# Patient Record
Sex: MALE | Race: WHITE | Employment: OTHER | URBAN - METROPOLITAN AREA
[De-identification: names, ages, dates, MRNs, and addresses within clinical notes are randomized per-mention and may not be internally consistent; named-entity substitution may affect disease eponyms.]

---

## 2017-04-22 ENCOUNTER — ED HISTORICAL/CONVERTED ENCOUNTER (OUTPATIENT)
Dept: OTHER | Age: 62
End: 2017-04-22

## 2018-10-09 ENCOUNTER — OP HISTORICAL/CONVERTED ENCOUNTER (OUTPATIENT)
Dept: OTHER | Age: 63
End: 2018-10-09

## 2018-10-10 ENCOUNTER — OP HISTORICAL/CONVERTED ENCOUNTER (OUTPATIENT)
Dept: OTHER | Age: 63
End: 2018-10-10

## 2018-10-24 ENCOUNTER — OP HISTORICAL/CONVERTED ENCOUNTER (OUTPATIENT)
Dept: OTHER | Age: 63
End: 2018-10-24

## 2018-11-26 ENCOUNTER — OP HISTORICAL/CONVERTED ENCOUNTER (OUTPATIENT)
Dept: OTHER | Age: 63
End: 2018-11-26

## 2019-02-21 ENCOUNTER — ED HISTORICAL/CONVERTED ENCOUNTER (OUTPATIENT)
Dept: OTHER | Age: 64
End: 2019-02-21

## 2019-03-15 ENCOUNTER — OP HISTORICAL/CONVERTED ENCOUNTER (OUTPATIENT)
Dept: OTHER | Age: 64
End: 2019-03-15

## 2020-06-02 ENCOUNTER — IP HISTORICAL/CONVERTED ENCOUNTER (OUTPATIENT)
Dept: OTHER | Age: 65
End: 2020-06-02

## 2020-06-20 ENCOUNTER — IP HISTORICAL/CONVERTED ENCOUNTER (OUTPATIENT)
Dept: OTHER | Age: 65
End: 2020-06-20

## 2020-07-06 ENCOUNTER — OP HISTORICAL/CONVERTED ENCOUNTER (OUTPATIENT)
Dept: OTHER | Age: 65
End: 2020-07-06

## 2020-08-01 ENCOUNTER — OP HISTORICAL/CONVERTED ENCOUNTER (OUTPATIENT)
Dept: OTHER | Age: 65
End: 2020-08-01

## 2020-08-30 ENCOUNTER — ED HISTORICAL/CONVERTED ENCOUNTER (OUTPATIENT)
Dept: OTHER | Age: 65
End: 2020-08-30

## 2020-10-23 ENCOUNTER — HOSPITAL ENCOUNTER (OUTPATIENT)
Dept: LAB | Age: 65
Discharge: HOME | End: 2020-10-23
Payer: MEDICARE

## 2020-10-23 ENCOUNTER — TRANSCRIBE ORDER (OUTPATIENT)
Dept: REGISTRATION | Age: 65
End: 2020-10-23

## 2020-10-23 DIAGNOSIS — E11.29 TYPE II OR UNSPECIFIED TYPE DIABETES MELLITUS WITH RENAL MANIFESTATIONS, UNCONTROLLED(250.42) (HCC): ICD-10-CM

## 2020-10-23 DIAGNOSIS — E11.65 TYPE II OR UNSPECIFIED TYPE DIABETES MELLITUS WITH RENAL MANIFESTATIONS, UNCONTROLLED(250.42) (HCC): ICD-10-CM

## 2020-10-23 DIAGNOSIS — E11.29 TYPE II OR UNSPECIFIED TYPE DIABETES MELLITUS WITH RENAL MANIFESTATIONS, UNCONTROLLED(250.42) (HCC): Primary | ICD-10-CM

## 2020-10-23 DIAGNOSIS — E11.65 TYPE II OR UNSPECIFIED TYPE DIABETES MELLITUS WITH RENAL MANIFESTATIONS, UNCONTROLLED(250.42) (HCC): Primary | ICD-10-CM

## 2020-10-23 PROCEDURE — 83036 HEMOGLOBIN GLYCOSYLATED A1C: CPT

## 2020-10-23 PROCEDURE — 82570 ASSAY OF URINE CREATININE: CPT

## 2020-10-23 PROCEDURE — 80061 LIPID PANEL: CPT

## 2020-10-23 PROCEDURE — 36415 COLL VENOUS BLD VENIPUNCTURE: CPT

## 2020-10-23 PROCEDURE — 80053 COMPREHEN METABOLIC PANEL: CPT

## 2020-10-23 PROCEDURE — 85025 COMPLETE CBC W/AUTO DIFF WBC: CPT

## 2020-10-23 PROCEDURE — 81001 URINALYSIS AUTO W/SCOPE: CPT

## 2020-10-23 PROCEDURE — 84443 ASSAY THYROID STIM HORMONE: CPT

## 2020-10-23 PROCEDURE — 84153 ASSAY OF PSA TOTAL: CPT

## 2020-10-23 PROCEDURE — 82043 UR ALBUMIN QUANTITATIVE: CPT

## 2021-06-16 ENCOUNTER — TRANSCRIBE ORDER (OUTPATIENT)
Dept: REGISTRATION | Age: 66
End: 2021-06-16

## 2021-06-16 ENCOUNTER — HOSPITAL ENCOUNTER (OUTPATIENT)
Dept: LAB | Age: 66
Discharge: HOME | End: 2021-06-16
Payer: MEDICARE

## 2021-06-16 DIAGNOSIS — B20 LAV-HTLV-III (LYMPHADENOPATHY-ASSOCIATED VIRUS-HUMAN T-LYMPHOTROPIC VIRUS TYPE III) (HCC): ICD-10-CM

## 2021-06-16 DIAGNOSIS — E78.2 MIXED HYPERLIPIDEMIA: ICD-10-CM

## 2021-06-16 DIAGNOSIS — Z87.891 PERSONAL HISTORY OF NICOTINE DEPENDENCE: ICD-10-CM

## 2021-06-16 DIAGNOSIS — I10 ESSENTIAL HYPERTENSION, MALIGNANT: ICD-10-CM

## 2021-06-16 DIAGNOSIS — I10 ESSENTIAL HYPERTENSION, MALIGNANT: Primary | ICD-10-CM

## 2021-06-16 DIAGNOSIS — E11.9 DIABETES MELLITUS (HCC): Primary | ICD-10-CM

## 2021-06-16 LAB
ALBUMIN SERPL-MCNC: 4.3 G/DL (ref 3.5–5)
ALBUMIN/GLOB SERPL: 1.5 {RATIO} (ref 1.1–2.2)
ALP SERPL-CCNC: 39 U/L (ref 45–117)
ALT SERPL-CCNC: 39 U/L (ref 12–78)
ANION GAP SERPL CALC-SCNC: 6 MMOL/L (ref 5–15)
APPEARANCE UR: CLEAR
AST SERPL W P-5'-P-CCNC: 65 U/L (ref 15–37)
BACTERIA URNS QL MICRO: NEGATIVE /HPF
BASOPHILS # BLD: 0.1 K/UL (ref 0–0.1)
BASOPHILS NFR BLD: 1 % (ref 0–1)
BILIRUB SERPL-MCNC: 1.3 MG/DL (ref 0.2–1)
BILIRUB UR QL: NEGATIVE
BUN SERPL-MCNC: 38 MG/DL (ref 6–20)
BUN/CREAT SERPL: 21 (ref 12–20)
CA-I BLD-MCNC: 8.8 MG/DL (ref 8.5–10.1)
CHLORIDE SERPL-SCNC: 108 MMOL/L (ref 97–108)
CO2 SERPL-SCNC: 29 MMOL/L (ref 21–32)
COLOR UR: ABNORMAL
CREAT SERPL-MCNC: 1.78 MG/DL (ref 0.7–1.3)
DIFFERENTIAL METHOD BLD: ABNORMAL
EOSINOPHIL # BLD: 0.6 K/UL (ref 0–0.4)
EOSINOPHIL NFR BLD: 7 % (ref 0–7)
ERYTHROCYTE [DISTWIDTH] IN BLOOD BY AUTOMATED COUNT: 13.1 % (ref 11.5–14.5)
GLOBULIN SER CALC-MCNC: 2.8 G/DL (ref 2–4)
GLUCOSE SERPL-MCNC: 115 MG/DL (ref 65–100)
GLUCOSE UR STRIP.AUTO-MCNC: NEGATIVE MG/DL
HCT VFR BLD AUTO: 43.4 % (ref 36.6–50.3)
HGB BLD-MCNC: 14 G/DL (ref 12.1–17)
HGB UR QL STRIP: NEGATIVE
HYALINE CASTS URNS QL MICRO: ABNORMAL /LPF (ref 0–5)
IMM GRANULOCYTES # BLD AUTO: 0 K/UL (ref 0–0.04)
IMM GRANULOCYTES NFR BLD AUTO: 0 % (ref 0–0.5)
KETONES UR QL STRIP.AUTO: NEGATIVE MG/DL
LEUKOCYTE ESTERASE UR QL STRIP.AUTO: NEGATIVE
LYMPHOCYTES # BLD: 1.8 K/UL (ref 0.8–3.5)
LYMPHOCYTES NFR BLD: 21 % (ref 12–49)
MCH RBC QN AUTO: 28.8 PG (ref 26–34)
MCHC RBC AUTO-ENTMCNC: 32.3 G/DL (ref 30–36.5)
MCV RBC AUTO: 89.3 FL (ref 80–99)
MONOCYTES # BLD: 0.5 K/UL (ref 0–1)
MONOCYTES NFR BLD: 6 % (ref 5–13)
MUCOUS THREADS URNS QL MICRO: ABNORMAL /LPF
NEUTS SEG # BLD: 5.7 K/UL (ref 1.8–8)
NEUTS SEG NFR BLD: 65 % (ref 32–75)
NITRITE UR QL STRIP.AUTO: NEGATIVE
NRBC # BLD: 0 K/UL (ref 0–0.01)
NRBC BLD-RTO: 0 PER 100 WBC
PH UR STRIP: 5 [PH] (ref 5–8)
PLATELET # BLD AUTO: 194 K/UL (ref 150–400)
PMV BLD AUTO: 9.9 FL (ref 8.9–12.9)
POTASSIUM SERPL-SCNC: 4.3 MMOL/L (ref 3.5–5.1)
PROT SERPL-MCNC: 7.1 G/DL (ref 6.4–8.2)
PROT UR STRIP-MCNC: NEGATIVE MG/DL
RBC # BLD AUTO: 4.86 M/UL (ref 4.1–5.7)
RBC #/AREA URNS HPF: ABNORMAL /HPF (ref 0–5)
SODIUM SERPL-SCNC: 143 MMOL/L (ref 136–145)
SP GR UR REFRACTOMETRY: 1.02 (ref 1–1.03)
UROBILINOGEN UR QL STRIP.AUTO: 0.1 EU/DL (ref 0.1–1)
WBC # BLD AUTO: 8.7 K/UL (ref 4.1–11.1)
WBC URNS QL MICRO: ABNORMAL /HPF (ref 0–4)

## 2021-06-16 PROCEDURE — 84153 ASSAY OF PSA TOTAL: CPT

## 2021-06-16 PROCEDURE — 82043 UR ALBUMIN QUANTITATIVE: CPT

## 2021-06-16 PROCEDURE — 80061 LIPID PANEL: CPT

## 2021-06-16 PROCEDURE — 84403 ASSAY OF TOTAL TESTOSTERONE: CPT

## 2021-06-16 PROCEDURE — 36415 COLL VENOUS BLD VENIPUNCTURE: CPT

## 2021-06-16 PROCEDURE — 80053 COMPREHEN METABOLIC PANEL: CPT

## 2021-06-16 PROCEDURE — 85025 COMPLETE CBC W/AUTO DIFF WBC: CPT

## 2021-06-16 PROCEDURE — 81001 URINALYSIS AUTO W/SCOPE: CPT

## 2021-06-16 PROCEDURE — 83036 HEMOGLOBIN GLYCOSYLATED A1C: CPT

## 2021-06-17 LAB
CHOLEST SERPL-MCNC: 99 MG/DL
CREAT UR-MCNC: 257 MG/DL
EST. AVERAGE GLUCOSE BLD GHB EST-MCNC: 111 MG/DL
HBA1C MFR BLD: 5.5 % (ref 4–5.6)
HDLC SERPL-MCNC: 38 MG/DL
HDLC SERPL: 2.6 {RATIO} (ref 0–5)
LDLC SERPL CALC-MCNC: 32.6 MG/DL (ref 0–100)
LIPID PROFILE,FLP: NORMAL
MICROALBUMIN UR-MCNC: 2.17 MG/DL
MICROALBUMIN/CREAT UR-RTO: 8 MGMALB/GCRE (ref 0–30)
PSA SERPL-MCNC: 4.5 NG/ML (ref 0.01–4)
TRIGL SERPL-MCNC: 142 MG/DL (ref ?–150)
VLDLC SERPL CALC-MCNC: 28.4 MG/DL

## 2021-10-15 ENCOUNTER — TRANSCRIBE ORDER (OUTPATIENT)
Dept: REGISTRATION | Age: 66
End: 2021-10-15

## 2021-10-15 ENCOUNTER — HOSPITAL ENCOUNTER (OUTPATIENT)
Dept: LAB | Age: 66
Discharge: HOME | End: 2021-10-15
Payer: MEDICARE

## 2021-10-15 DIAGNOSIS — L28.1 PRURIGO NODULARIS: ICD-10-CM

## 2021-10-15 DIAGNOSIS — E11.9 DIABETES MELLITUS (HCC): ICD-10-CM

## 2021-10-15 DIAGNOSIS — I10 ESSENTIAL HYPERTENSION, MALIGNANT: ICD-10-CM

## 2021-10-15 DIAGNOSIS — Z12.5 SPECIAL SCREENING, PROSTATE CANCER: ICD-10-CM

## 2021-10-15 DIAGNOSIS — E11.9 DIABETES MELLITUS (HCC): Primary | ICD-10-CM

## 2021-10-15 LAB
ALBUMIN SERPL-MCNC: 4.2 G/DL (ref 3.5–5)
ALBUMIN/GLOB SERPL: 1.5 {RATIO} (ref 1.1–2.2)
ALP SERPL-CCNC: 46 U/L (ref 45–117)
ALT SERPL-CCNC: 34 U/L (ref 12–78)
ANION GAP SERPL CALC-SCNC: 5 MMOL/L (ref 5–15)
APPEARANCE UR: ABNORMAL
AST SERPL W P-5'-P-CCNC: 36 U/L (ref 15–37)
BACTERIA URNS QL MICRO: NEGATIVE /HPF
BASOPHILS # BLD: 0.1 K/UL (ref 0–0.1)
BASOPHILS NFR BLD: 1 % (ref 0–1)
BILIRUB SERPL-MCNC: 1.2 MG/DL (ref 0.2–1)
BILIRUB UR QL: NEGATIVE
BUN SERPL-MCNC: 24 MG/DL (ref 6–20)
BUN/CREAT SERPL: 19 (ref 12–20)
CA-I BLD-MCNC: 9.2 MG/DL (ref 8.5–10.1)
CHLORIDE SERPL-SCNC: 111 MMOL/L (ref 97–108)
CO2 SERPL-SCNC: 28 MMOL/L (ref 21–32)
COLOR UR: ABNORMAL
CREAT SERPL-MCNC: 1.27 MG/DL (ref 0.7–1.3)
DIFFERENTIAL METHOD BLD: NORMAL
EOSINOPHIL # BLD: 0.4 K/UL (ref 0–0.4)
EOSINOPHIL NFR BLD: 5 % (ref 0–7)
ERYTHROCYTE [DISTWIDTH] IN BLOOD BY AUTOMATED COUNT: 13.5 % (ref 11.5–14.5)
GLOBULIN SER CALC-MCNC: 2.8 G/DL (ref 2–4)
GLUCOSE SERPL-MCNC: 107 MG/DL (ref 65–100)
GLUCOSE UR STRIP.AUTO-MCNC: NEGATIVE MG/DL
HCT VFR BLD AUTO: 41.9 % (ref 36.6–50.3)
HGB BLD-MCNC: 14 G/DL (ref 12.1–17)
HGB UR QL STRIP: NEGATIVE
IMM GRANULOCYTES # BLD AUTO: 0 K/UL (ref 0–0.04)
IMM GRANULOCYTES NFR BLD AUTO: 0 % (ref 0–0.5)
KETONES UR QL STRIP.AUTO: NEGATIVE MG/DL
LEUKOCYTE ESTERASE UR QL STRIP.AUTO: NEGATIVE
LYMPHOCYTES # BLD: 1.8 K/UL (ref 0.8–3.5)
LYMPHOCYTES NFR BLD: 21 % (ref 12–49)
MCH RBC QN AUTO: 29.7 PG (ref 26–34)
MCHC RBC AUTO-ENTMCNC: 33.4 G/DL (ref 30–36.5)
MCV RBC AUTO: 88.8 FL (ref 80–99)
MONOCYTES # BLD: 0.5 K/UL (ref 0–1)
MONOCYTES NFR BLD: 6 % (ref 5–13)
MUCOUS THREADS URNS QL MICRO: ABNORMAL /LPF
NEUTS SEG # BLD: 6 K/UL (ref 1.8–8)
NEUTS SEG NFR BLD: 67 % (ref 32–75)
NITRITE UR QL STRIP.AUTO: NEGATIVE
NRBC # BLD: 0 K/UL (ref 0–0.01)
NRBC BLD-RTO: 0 PER 100 WBC
PH UR STRIP: 5 [PH] (ref 5–8)
PLATELET # BLD AUTO: 189 K/UL (ref 150–400)
PMV BLD AUTO: 9.8 FL (ref 8.9–12.9)
POTASSIUM SERPL-SCNC: 4.1 MMOL/L (ref 3.5–5.1)
PROT SERPL-MCNC: 7 G/DL (ref 6.4–8.2)
PROT UR STRIP-MCNC: NEGATIVE MG/DL
RBC # BLD AUTO: 4.72 M/UL (ref 4.1–5.7)
RBC #/AREA URNS HPF: ABNORMAL /HPF (ref 0–5)
SODIUM SERPL-SCNC: 144 MMOL/L (ref 136–145)
SP GR UR REFRACTOMETRY: 1.02 (ref 1–1.03)
TSH SERPL DL<=0.05 MIU/L-ACNC: 0.97 UIU/ML (ref 0.36–3.74)
UROBILINOGEN UR QL STRIP.AUTO: 2 EU/DL (ref 0.1–1)
WBC # BLD AUTO: 8.8 K/UL (ref 4.1–11.1)
WBC URNS QL MICRO: ABNORMAL /HPF (ref 0–4)

## 2021-10-15 PROCEDURE — 82043 UR ALBUMIN QUANTITATIVE: CPT

## 2021-10-15 PROCEDURE — 84403 ASSAY OF TOTAL TESTOSTERONE: CPT

## 2021-10-15 PROCEDURE — 84153 ASSAY OF PSA TOTAL: CPT

## 2021-10-15 PROCEDURE — 80061 LIPID PANEL: CPT

## 2021-10-15 PROCEDURE — 86592 SYPHILIS TEST NON-TREP QUAL: CPT

## 2021-10-15 PROCEDURE — 36415 COLL VENOUS BLD VENIPUNCTURE: CPT

## 2021-10-15 PROCEDURE — 81001 URINALYSIS AUTO W/SCOPE: CPT

## 2021-10-15 PROCEDURE — 85025 COMPLETE CBC W/AUTO DIFF WBC: CPT

## 2021-10-15 PROCEDURE — 84443 ASSAY THYROID STIM HORMONE: CPT

## 2021-10-15 PROCEDURE — 83036 HEMOGLOBIN GLYCOSYLATED A1C: CPT

## 2021-10-15 PROCEDURE — 80053 COMPREHEN METABOLIC PANEL: CPT

## 2021-10-16 LAB
CHOLEST SERPL-MCNC: 116 MG/DL
CREAT UR-MCNC: 269 MG/DL
HDLC SERPL-MCNC: 48 MG/DL
HDLC SERPL: 2.4 {RATIO} (ref 0–5)
LDLC SERPL CALC-MCNC: 49.6 MG/DL (ref 0–100)
LIPID PROFILE,FLP: NORMAL
MICROALBUMIN UR-MCNC: 7.13 MG/DL
MICROALBUMIN/CREAT UR-RTO: 27 MGMALB/GCRE (ref 0–30)
PSA SERPL-MCNC: 5.8 NG/ML (ref 0.01–4)
RPR SER QL: NONREACTIVE
TRIGL SERPL-MCNC: 92 MG/DL (ref ?–150)
VLDLC SERPL CALC-MCNC: 18.4 MG/DL

## 2021-10-17 LAB
COMMENT, TESC2: NORMAL
TESTOST SERPL-MCNC: 364 NG/DL (ref 264–916)

## 2021-11-22 ENCOUNTER — HOSPITAL ENCOUNTER (OUTPATIENT)
Dept: PREADMISSION TESTING | Age: 66
Discharge: HOME | End: 2021-11-22
Payer: MEDICARE

## 2021-11-22 VITALS
TEMPERATURE: 98.2 F | HEART RATE: 49 BPM | DIASTOLIC BLOOD PRESSURE: 77 MMHG | HEIGHT: 70 IN | SYSTOLIC BLOOD PRESSURE: 155 MMHG | OXYGEN SATURATION: 98 % | WEIGHT: 189 LBS | RESPIRATION RATE: 18 BRPM | BODY MASS INDEX: 27.06 KG/M2

## 2021-11-22 LAB
ALBUMIN SERPL-MCNC: 3.6 G/DL (ref 3.5–5)
ALBUMIN/GLOB SERPL: 1.1 {RATIO} (ref 1.1–2.2)
ALP SERPL-CCNC: 52 U/L (ref 45–117)
ALT SERPL-CCNC: 28 U/L (ref 12–78)
ANION GAP SERPL CALC-SCNC: 2 MMOL/L (ref 5–15)
APPEARANCE UR: CLEAR
APTT PPP: 34.6 SEC (ref 21.2–34.1)
AST SERPL W P-5'-P-CCNC: 21 U/L (ref 15–37)
BACTERIA URNS QL MICRO: NEGATIVE /HPF
BILIRUB SERPL-MCNC: 0.6 MG/DL (ref 0.2–1)
BILIRUB UR QL: NEGATIVE
BUN SERPL-MCNC: 14 MG/DL (ref 6–20)
BUN/CREAT SERPL: 11 (ref 12–20)
CA-I BLD-MCNC: 8.8 MG/DL (ref 8.5–10.1)
CHLORIDE SERPL-SCNC: 112 MMOL/L (ref 97–108)
CO2 SERPL-SCNC: 28 MMOL/L (ref 21–32)
COLOR UR: NORMAL
CREAT SERPL-MCNC: 1.25 MG/DL (ref 0.7–1.3)
ERYTHROCYTE [DISTWIDTH] IN BLOOD BY AUTOMATED COUNT: 13.8 % (ref 11.5–14.5)
GLOBULIN SER CALC-MCNC: 3.4 G/DL (ref 2–4)
GLUCOSE SERPL-MCNC: 84 MG/DL (ref 65–100)
GLUCOSE UR STRIP.AUTO-MCNC: NEGATIVE MG/DL
HCT VFR BLD AUTO: 43 % (ref 36.6–50.3)
HGB BLD-MCNC: 14.1 G/DL (ref 12.1–17)
HGB UR QL STRIP: NEGATIVE
INR PPP: 1.1 (ref 0.9–1.1)
KETONES UR QL STRIP.AUTO: NEGATIVE MG/DL
LEUKOCYTE ESTERASE UR QL STRIP.AUTO: NEGATIVE
MCH RBC QN AUTO: 29.6 PG (ref 26–34)
MCHC RBC AUTO-ENTMCNC: 32.8 G/DL (ref 30–36.5)
MCV RBC AUTO: 90.1 FL (ref 80–99)
NITRITE UR QL STRIP.AUTO: NEGATIVE
NRBC # BLD: 0 K/UL (ref 0–0.01)
NRBC BLD-RTO: 0 PER 100 WBC
PH UR STRIP: 7 [PH] (ref 5–8)
PLATELET # BLD AUTO: 208 K/UL (ref 150–400)
PMV BLD AUTO: 10.2 FL (ref 8.9–12.9)
POTASSIUM SERPL-SCNC: 4 MMOL/L (ref 3.5–5.1)
PROT SERPL-MCNC: 7 G/DL (ref 6.4–8.2)
PROT UR STRIP-MCNC: NEGATIVE MG/DL
PROTHROMBIN TIME: 14 SEC (ref 11.9–14.7)
RBC # BLD AUTO: 4.77 M/UL (ref 4.1–5.7)
RBC #/AREA URNS HPF: NORMAL /HPF (ref 0–5)
SODIUM SERPL-SCNC: 142 MMOL/L (ref 136–145)
SP GR UR REFRACTOMETRY: <1.005 (ref 1–1.03)
THERAPEUTIC RANGE,PTTT: ABNORMAL SEC (ref 82–109)
UA: UC IF INDICATED,UAUC: NORMAL
UROBILINOGEN UR QL STRIP.AUTO: 0.1 EU/DL (ref 0.1–1)
WBC # BLD AUTO: 11.3 K/UL (ref 4.1–11.1)
WBC URNS QL MICRO: NORMAL /HPF (ref 0–4)

## 2021-11-22 PROCEDURE — 80053 COMPREHEN METABOLIC PANEL: CPT

## 2021-11-22 PROCEDURE — 81001 URINALYSIS AUTO W/SCOPE: CPT

## 2021-11-22 PROCEDURE — 85610 PROTHROMBIN TIME: CPT

## 2021-11-22 PROCEDURE — 85730 THROMBOPLASTIN TIME PARTIAL: CPT

## 2021-11-22 PROCEDURE — 85027 COMPLETE CBC AUTOMATED: CPT

## 2021-11-22 PROCEDURE — 36415 COLL VENOUS BLD VENIPUNCTURE: CPT

## 2021-11-22 RX ORDER — ROSUVASTATIN CALCIUM 10 MG/1
10 TABLET, COATED ORAL
COMMUNITY
End: 2021-12-16 | Stop reason: ALTCHOICE

## 2021-11-22 RX ORDER — CLOPIDOGREL BISULFATE 75 MG/1
75 TABLET ORAL DAILY
COMMUNITY

## 2021-11-22 RX ORDER — LISINOPRIL 40 MG/1
40 TABLET ORAL DAILY
COMMUNITY
End: 2021-12-16 | Stop reason: ALTCHOICE

## 2021-11-22 RX ORDER — ASPIRIN 81 MG/1
81 TABLET ORAL DAILY
COMMUNITY

## 2021-11-22 RX ORDER — METOPROLOL SUCCINATE 50 MG/1
25 TABLET, EXTENDED RELEASE ORAL DAILY
COMMUNITY
End: 2021-12-16 | Stop reason: ALTCHOICE

## 2021-11-22 NOTE — PROGRESS NOTES
80- Dr. Mallory De La Fuente called made aware patient stated during PAT approx 2 weeks ago he noticed swelling, pain, and a knot in his left lower leg went to see his PCP and was told he had a blood clot and to apply heat to the area the patient stated he did this and the pain and swelling have subsided and the knot is almost gone. Also made Dr. Mallory De La Fuente aware that the patient stated the scabbed areas all over his body are from a reaction to medication that he no longer takes. No new orders received Dr. Mallory De La Fuente stated ok to proceed with procedure.

## 2021-11-22 NOTE — PROGRESS NOTES
5- Dr. Clements Doctor called made aware of patient's abnormal labs CMP-- Chloride 112, Anion gap 2, BUN/Creatinine ratio 11, GFR est non-AA 58, CBC-- WBC 11.3, PTT 34.6. No new orders received stated ok to proceed with cardiac cath as scheduled.

## 2021-11-23 ENCOUNTER — HOSPITAL ENCOUNTER (OUTPATIENT)
Age: 66
Discharge: HOME | End: 2021-11-24
Attending: INTERNAL MEDICINE | Admitting: INTERNAL MEDICINE
Payer: MEDICARE

## 2021-11-23 DIAGNOSIS — I20.0 UNSTABLE ANGINA (HCC): ICD-10-CM

## 2021-11-23 DIAGNOSIS — R94.39 ABNORMAL STRESS TEST: ICD-10-CM

## 2021-11-23 PROBLEM — R07.9 CHEST PAIN: Status: ACTIVE | Noted: 2021-11-23

## 2021-11-23 LAB
ACT BLD: 288 SEC (ref 74–125)
ATRIAL RATE: 84 BPM
CALCULATED P AXIS, ECG09: 70 DEGREES
CALCULATED R AXIS, ECG10: 84 DEGREES
CALCULATED T AXIS, ECG11: 109 DEGREES
DIAGNOSIS, 93000: NORMAL
P-R INTERVAL, ECG05: 206 MS
PERFORMED BY, TECHID: ABNORMAL
Q-T INTERVAL, ECG07: 392 MS
QRS DURATION, ECG06: 84 MS
QTC CALCULATION (BEZET), ECG08: 463 MS
VENTRICULAR RATE, ECG03: 84 BPM

## 2021-11-23 PROCEDURE — 85347 COAGULATION TIME ACTIVATED: CPT

## 2021-11-23 PROCEDURE — 77030013516 HC DEV INFL ANGI MRTM -B: Performed by: INTERNAL MEDICINE

## 2021-11-23 PROCEDURE — 74011000636 HC RX REV CODE- 636: Performed by: INTERNAL MEDICINE

## 2021-11-23 PROCEDURE — 74011250637 HC RX REV CODE- 250/637: Performed by: INTERNAL MEDICINE

## 2021-11-23 PROCEDURE — 74011000250 HC RX REV CODE- 250: Performed by: INTERNAL MEDICINE

## 2021-11-23 PROCEDURE — C1887 CATHETER, GUIDING: HCPCS | Performed by: INTERNAL MEDICINE

## 2021-11-23 PROCEDURE — 99153 MOD SED SAME PHYS/QHP EA: CPT | Performed by: INTERNAL MEDICINE

## 2021-11-23 PROCEDURE — 77030025703 HC SYR ANGI VACLOK MRTM -A: Performed by: INTERNAL MEDICINE

## 2021-11-23 PROCEDURE — 2709999900 HC NON-CHARGEABLE SUPPLY: Performed by: INTERNAL MEDICINE

## 2021-11-23 PROCEDURE — 77030008542 HC TBNG MON PRSS EDWD -A: Performed by: INTERNAL MEDICINE

## 2021-11-23 PROCEDURE — C1769 GUIDE WIRE: HCPCS | Performed by: INTERNAL MEDICINE

## 2021-11-23 PROCEDURE — 77030016699 HC CATH ANGI DX INFN1 CARD -A: Performed by: INTERNAL MEDICINE

## 2021-11-23 PROCEDURE — 77030015766: Performed by: INTERNAL MEDICINE

## 2021-11-23 PROCEDURE — 74011250636 HC RX REV CODE- 250/636: Performed by: INTERNAL MEDICINE

## 2021-11-23 PROCEDURE — 93458 L HRT ARTERY/VENTRICLE ANGIO: CPT | Performed by: INTERNAL MEDICINE

## 2021-11-23 PROCEDURE — C1725 CATH, TRANSLUMIN NON-LASER: HCPCS | Performed by: INTERNAL MEDICINE

## 2021-11-23 PROCEDURE — C1894 INTRO/SHEATH, NON-LASER: HCPCS | Performed by: INTERNAL MEDICINE

## 2021-11-23 PROCEDURE — 76210000063 HC OR PH I REC FIRST 0.5 HR: Performed by: INTERNAL MEDICINE

## 2021-11-23 PROCEDURE — 77030042317 HC BND COMPR HEMSTAT -B: Performed by: INTERNAL MEDICINE

## 2021-11-23 PROCEDURE — 93005 ELECTROCARDIOGRAM TRACING: CPT

## 2021-11-23 PROCEDURE — 77030012468 HC VLV BLEEDBK CNTRL ABBT -B: Performed by: INTERNAL MEDICINE

## 2021-11-23 PROCEDURE — 99152 MOD SED SAME PHYS/QHP 5/>YRS: CPT | Performed by: INTERNAL MEDICINE

## 2021-11-23 PROCEDURE — 77030019698 HC SYR ANGI MDLON MRTM -A: Performed by: INTERNAL MEDICINE

## 2021-11-23 PROCEDURE — C1874 STENT, COATED/COV W/DEL SYS: HCPCS | Performed by: INTERNAL MEDICINE

## 2021-11-23 PROCEDURE — 92928 PRQ TCAT PLMT NTRAC ST 1 LES: CPT | Performed by: INTERNAL MEDICINE

## 2021-11-23 DEVICE — XIENCE SIERRA™ EVEROLIMUS ELUTING CORONARY STENT SYSTEM 2.75 MM X 15 MM / RAPID-EXCHANGE
Type: IMPLANTABLE DEVICE | Site: CORONARY | Status: FUNCTIONAL
Brand: XIENCE SIERRA™

## 2021-11-23 RX ORDER — HYDRALAZINE HYDROCHLORIDE 20 MG/ML
INJECTION INTRAMUSCULAR; INTRAVENOUS AS NEEDED
Status: DISCONTINUED | OUTPATIENT
Start: 2021-11-23 | End: 2021-11-23 | Stop reason: HOSPADM

## 2021-11-23 RX ORDER — SODIUM CHLORIDE 9 MG/ML
50 INJECTION, SOLUTION INTRAVENOUS CONTINUOUS
Status: DISCONTINUED | OUTPATIENT
Start: 2021-11-23 | End: 2021-11-24 | Stop reason: HOSPADM

## 2021-11-23 RX ORDER — ONDANSETRON 2 MG/ML
4 INJECTION INTRAMUSCULAR; INTRAVENOUS
Status: DISCONTINUED | OUTPATIENT
Start: 2021-11-23 | End: 2021-11-24 | Stop reason: HOSPADM

## 2021-11-23 RX ORDER — FENTANYL CITRATE 50 UG/ML
INJECTION, SOLUTION INTRAMUSCULAR; INTRAVENOUS AS NEEDED
Status: DISCONTINUED | OUTPATIENT
Start: 2021-11-23 | End: 2021-11-23 | Stop reason: HOSPADM

## 2021-11-23 RX ORDER — SODIUM CHLORIDE 0.9 % (FLUSH) 0.9 %
5-40 SYRINGE (ML) INJECTION EVERY 8 HOURS
Status: DISCONTINUED | OUTPATIENT
Start: 2021-11-23 | End: 2021-11-24 | Stop reason: HOSPADM

## 2021-11-23 RX ORDER — HEPARIN SODIUM 200 [USP'U]/100ML
INJECTION, SOLUTION INTRAVENOUS
Status: COMPLETED | OUTPATIENT
Start: 2021-11-23 | End: 2021-11-23

## 2021-11-23 RX ORDER — LISINOPRIL 40 MG/1
40 TABLET ORAL DAILY
Status: DISCONTINUED | OUTPATIENT
Start: 2021-11-24 | End: 2021-11-24 | Stop reason: HOSPADM

## 2021-11-23 RX ORDER — VERAPAMIL HYDROCHLORIDE 2.5 MG/ML
INJECTION, SOLUTION INTRAVENOUS AS NEEDED
Status: DISCONTINUED | OUTPATIENT
Start: 2021-11-23 | End: 2021-11-23 | Stop reason: HOSPADM

## 2021-11-23 RX ORDER — HEPARIN SODIUM 1000 [USP'U]/ML
INJECTION, SOLUTION INTRAVENOUS; SUBCUTANEOUS AS NEEDED
Status: DISCONTINUED | OUTPATIENT
Start: 2021-11-23 | End: 2021-11-23 | Stop reason: HOSPADM

## 2021-11-23 RX ORDER — MIDAZOLAM HYDROCHLORIDE 1 MG/ML
INJECTION INTRAMUSCULAR; INTRAVENOUS AS NEEDED
Status: DISCONTINUED | OUTPATIENT
Start: 2021-11-23 | End: 2021-11-23 | Stop reason: HOSPADM

## 2021-11-23 RX ORDER — NITROGLYCERIN 5 MG/ML
INJECTION, SOLUTION INTRAVENOUS AS NEEDED
Status: DISCONTINUED | OUTPATIENT
Start: 2021-11-23 | End: 2021-11-23 | Stop reason: HOSPADM

## 2021-11-23 RX ORDER — LIDOCAINE HYDROCHLORIDE 10 MG/ML
INJECTION INFILTRATION; PERINEURAL AS NEEDED
Status: DISCONTINUED | OUTPATIENT
Start: 2021-11-23 | End: 2021-11-23 | Stop reason: HOSPADM

## 2021-11-23 RX ORDER — CLOPIDOGREL BISULFATE 75 MG/1
75 TABLET ORAL DAILY
Status: DISCONTINUED | OUTPATIENT
Start: 2021-11-24 | End: 2021-11-24 | Stop reason: HOSPADM

## 2021-11-23 RX ORDER — SODIUM CHLORIDE 0.9 % (FLUSH) 0.9 %
5-40 SYRINGE (ML) INJECTION AS NEEDED
Status: DISCONTINUED | OUTPATIENT
Start: 2021-11-23 | End: 2021-11-24 | Stop reason: HOSPADM

## 2021-11-23 RX ORDER — METOPROLOL SUCCINATE 25 MG/1
25 TABLET, EXTENDED RELEASE ORAL DAILY
Status: DISCONTINUED | OUTPATIENT
Start: 2021-11-24 | End: 2021-11-24 | Stop reason: HOSPADM

## 2021-11-23 RX ORDER — ROSUVASTATIN CALCIUM 5 MG/1
10 TABLET, COATED ORAL
Status: DISCONTINUED | OUTPATIENT
Start: 2021-11-23 | End: 2021-11-24 | Stop reason: HOSPADM

## 2021-11-23 RX ORDER — ASPIRIN 81 MG/1
81 TABLET ORAL DAILY
Status: DISCONTINUED | OUTPATIENT
Start: 2021-11-24 | End: 2021-11-24 | Stop reason: HOSPADM

## 2021-11-23 RX ADMIN — ONDANSETRON 4 MG: 2 INJECTION INTRAMUSCULAR; INTRAVENOUS at 12:43

## 2021-11-23 RX ADMIN — NITROGLYCERIN 1 INCH: 20 OINTMENT TOPICAL at 20:26

## 2021-11-23 RX ADMIN — SODIUM CHLORIDE 50 ML/HR: 9 INJECTION, SOLUTION INTRAVENOUS at 08:36

## 2021-11-23 RX ADMIN — NITROGLYCERIN 1 INCH: 20 OINTMENT TOPICAL at 14:32

## 2021-11-23 RX ADMIN — ROSUVASTATIN CALCIUM 10 MG: 5 TABLET, COATED ORAL at 20:26

## 2021-11-23 RX ADMIN — Medication 10 ML: at 21:29

## 2021-11-23 NOTE — Clinical Note
TRANSFER - OUT REPORT:     Verbal report given to: Aniyah (at bedside). Report consisted of patient's Situation, Background, Assessment and   Recommendations(SBAR). Opportunity for questions and clarification was provided. Patient transported with a Registered Nurse and Monitor. Patient transported to: recovery.

## 2021-11-23 NOTE — Clinical Note
Diagnostic wire inserted. Spine appears normal, range of motion is not limited, no muscle or joint tenderness

## 2021-11-23 NOTE — PROGRESS NOTES
Patient transferred from PACU in stable condition. Initial VS and assessment performed. Reviewed plan of care with patient.

## 2021-11-23 NOTE — PROGRESS NOTES
Problem: Falls - Risk of  Goal: *Absence of Falls  Description: Document [place] Flow Fall Risk and appropriate interventions in the flowsheet.   Outcome: Progressing Towards Goal  Note: Fall Risk Interventions:                                Problem: Patient Education: Go to Patient Education Activity  Goal: Patient/Family Education  Outcome: Progressing Towards Goal

## 2021-11-23 NOTE — Clinical Note
Sheath #1: Sheath: inserted. Sheath inserted/placed in the right radial  artery.  6Fr Glidesheath slender

## 2021-11-23 NOTE — Clinical Note
Contrast Dose Calculator:   Patient's age: 78.   Patient's sex: Male. Patient weight (kg) = 85.7. Creatinine level (mg/dL) = 1.25. Creatinine clearance (mL/min): 69.51. Contrast concentration (mg/mL) = 370. MACD = 277.95 mL. Max Contrast dose per Creatinine Cl calculator = 156.4 mL.

## 2021-11-23 NOTE — Clinical Note
Right groin and right radial prepped with ChloraPrep and draped. Wet prep solution applied at: 951. Wet prep solution dried at: 954. Wet prep elapsed drying time: 3 mins.

## 2021-11-23 NOTE — ROUTINE PROCESS
Family updated at 1:09 PM by Krissy Mckeon RN.   Wife Reggie Jaramillo [phone]  ntfd pt waiting on bed assignment

## 2021-11-24 VITALS
SYSTOLIC BLOOD PRESSURE: 136 MMHG | HEART RATE: 68 BPM | WEIGHT: 182.2 LBS | TEMPERATURE: 98.3 F | DIASTOLIC BLOOD PRESSURE: 66 MMHG | OXYGEN SATURATION: 95 % | BODY MASS INDEX: 26.14 KG/M2 | RESPIRATION RATE: 18 BRPM

## 2021-11-24 LAB
ANION GAP SERPL CALC-SCNC: 5 MMOL/L (ref 5–15)
BUN SERPL-MCNC: 20 MG/DL (ref 6–20)
BUN/CREAT SERPL: 15 (ref 12–20)
CA-I BLD-MCNC: 8.5 MG/DL (ref 8.5–10.1)
CHLORIDE SERPL-SCNC: 114 MMOL/L (ref 97–108)
CO2 SERPL-SCNC: 26 MMOL/L (ref 21–32)
CREAT SERPL-MCNC: 1.3 MG/DL (ref 0.7–1.3)
ERYTHROCYTE [DISTWIDTH] IN BLOOD BY AUTOMATED COUNT: 14 % (ref 11.5–14.5)
GLUCOSE SERPL-MCNC: 133 MG/DL (ref 65–100)
HCT VFR BLD AUTO: 41.7 % (ref 36.6–50.3)
HGB BLD-MCNC: 14.1 G/DL (ref 12.1–17)
MCH RBC QN AUTO: 30.1 PG (ref 26–34)
MCHC RBC AUTO-ENTMCNC: 33.8 G/DL (ref 30–36.5)
MCV RBC AUTO: 89.1 FL (ref 80–99)
NRBC # BLD: 0 K/UL (ref 0–0.01)
NRBC BLD-RTO: 0 PER 100 WBC
PLATELET # BLD AUTO: 215 K/UL (ref 150–400)
PMV BLD AUTO: 9.8 FL (ref 8.9–12.9)
POTASSIUM SERPL-SCNC: 3.7 MMOL/L (ref 3.5–5.1)
RBC # BLD AUTO: 4.68 M/UL (ref 4.1–5.7)
SODIUM SERPL-SCNC: 145 MMOL/L (ref 136–145)
WBC # BLD AUTO: 15.4 K/UL (ref 4.1–11.1)

## 2021-11-24 PROCEDURE — 36415 COLL VENOUS BLD VENIPUNCTURE: CPT

## 2021-11-24 PROCEDURE — 85027 COMPLETE CBC AUTOMATED: CPT

## 2021-11-24 PROCEDURE — 80048 BASIC METABOLIC PNL TOTAL CA: CPT

## 2021-11-24 PROCEDURE — 74011250637 HC RX REV CODE- 250/637: Performed by: INTERNAL MEDICINE

## 2021-11-24 RX ADMIN — LISINOPRIL 40 MG: 40 TABLET ORAL at 08:43

## 2021-11-24 RX ADMIN — METOPROLOL SUCCINATE 25 MG: 25 TABLET, EXTENDED RELEASE ORAL at 08:43

## 2021-11-24 RX ADMIN — CLOPIDOGREL BISULFATE 75 MG: 75 TABLET ORAL at 08:43

## 2021-11-24 RX ADMIN — ASPIRIN 81 MG: 81 TABLET, COATED ORAL at 08:43

## 2021-11-24 RX ADMIN — NITROGLYCERIN 1 INCH: 20 OINTMENT TOPICAL at 08:43

## 2021-11-24 NOTE — DISCHARGE SUMMARY
Cardiology Discharge Summary     Patient ID:  Nitesh Adan  [id number]  89 y.o.  1955    Allergies: Penicillins    Admit Date: 11/23/2021    Discharge Date: 11/24/2021     Admitting Physician: Alex Viera MD     Discharge Physician: Alex Viera MD    * Admission Diagnoses: Abnormal stress test [R94.39]  Unstable angina [facility]) [I20.0]  Chest pain [R07.9]    * Discharge Diagnoses:   Hospital Problems as of 11/24/2021 Never Reviewed          Codes Class Noted - Resolved POA    Chest pain ICD-10-CM: R07.9  ICD-9-CM: 786.50  11/23/2021 - Present Unknown              * Discharge Condition: good    * Cardiology Procedures this Admission:  Left heart catheterization with PCI      [facility] Hospital Course: Patient is a 54-year-old male with a history of coronary artery disease status post PCI, hypertension who presented for elective left heart catheterization due to recent outpatient abnormal stress test.  Cardiac catheterization was performed via right radial artery, final report pending. He underwent successful percutaneous coronary intervention to the mid LAD distal to previously placed stents with a 2.75 mm X 15 millimeter Xience drug-eluting stent with excellent postprocedure angiographic results. He was kept overnight following the procedure. When seen by me on 11/24/2021, patient denied any complaints. Right wrist had no evidence for swelling. Renal function remained stable. He was discharged to home in stable condition and will continue outpatient follow-up. Discharge Exam:     Visit Vitals  /66 (BP Patient Position: At rest;Lying right side)   Pulse 68   Temp 98.3 °F (36.8 °C)   Resp 18   Wt 82.6 kg (182 lb 3.2 oz)   SpO2 95%   BMI 26.14 kg/m²     General Appearance:  Well developed, well nourished,alert and oriented x 3, and individual in no acute distress. Ears/Nose/Mouth/Throat:   Hearing grossly normal.         Neck: Supple. Chest:   Lungs clear to auscultation bilaterally.    Cardiovascular: Regular rate and rhythm, S1, S2 normal, no murmur. Abdomen:   Soft, non-tender, bowel sounds are active. Extremities: No edema bilaterally. Skin: Warm and dry. * Disposition: Home    Discharge Medications:   Discharge Medication List as of 11/24/2021 11:35 AM      CONTINUE these medications which have NOT CHANGED    Details   aspirin delayed-release 81 mg tablet Take 81 mg by mouth daily. , Historical Med      lisinopriL (PRINIVIL, ZESTRIL) 40 mg tablet Take 40 mg by mouth daily. , Historical Med      clopidogreL (Plavix) 75 mg tab Take 75 mg by mouth daily. , Historical Med      metoprolol succinate (TOPROL-XL) 50 mg XL tablet Take 25 mg by mouth daily. , Historical Med      rosuvastatin (CRESTOR) 10 mg tablet Take 10 mg by mouth nightly., Historical Med             * Follow-up Care/Patient Instructions: Activity:Activity as tolerated  Diet: Cardiac Diet  Wound Care: As directed    Follow-up Information     Follow up With Specialties Details Why Contact Stephanie Rocha MD Family Medicine  Per [address], Patient or Family Member must call and kyrie follow-up appointment.  [address] [address]  [phone]      Brent Figueroa MD Cardiology Go on 12/2/2021 2;45pm [place] [address]  [phone]            Signed:  Lance Rios MD  11/24/2021  4:27 PM

## 2021-11-24 NOTE — DISCHARGE INSTRUCTIONS
Patient Education        Coronary Angiogram: What to Expect at Home  Your Recovery     A coronary angiogram is a test to examine the large blood vessel of your heart (coronary artery). The doctor inserted a thin, flexible tube (catheter) into a blood vessel in your groin. In some cases, the catheter is placed in a blood vessel in the arm. Your groin or arm may have a bruise and feel sore for a day or two after the procedure. You can do light activities around the house but nothing strenuous for several days. This care sheet gives you a general idea about how long it will take for you to recover. But each person recovers at a different pace. Follow the steps below to feel better as quickly as possible. How can you care for yourself at home? Activity    · If the doctor gave you a sedative:  ? For 24 hours, don't do anything that requires attention to detail, such as going to work, making important decisions, or signing any legal documents. It takes time for the medicine's effects to completely wear off.  ? For your safety, do not drive or operate any machinery that could be dangerous. Wait until the medicine wears off and you can think clearly and react easily.     · Do not do strenuous exercise and do not lift, pull, or push anything heavy until your doctor says it is okay. This may be for a day or two. You can walk around the house and do light activity, such as cooking.     · If the catheter was placed in your groin, try not to walk up stairs for the first couple of days.     · If the catheter was placed in your arm near your wrist, do not bend your wrist deeply for the first couple of days. Be careful using your hand to get into and out of a chair or bed.     · If your doctor recommends it, get more exercise. Walking is a good choice. Bit by bit, increase the amount you walk every day. Try for at least 30 minutes on most days of the week. Diet    · Drink plenty of fluids to help your body flush out the dye. If you have kidney, heart, or liver disease and have to limit fluids, talk with your doctor before you increase the amount of fluids you drink.     · Keep eating a heart-healthy diet that has lots of fruits, vegetables, and whole grains. If you have not been eating this way, talk to your doctor. You also may want to talk to a dietitian. This expert can help you to learn about healthy foods and plan meals. Medicines    · Your doctor will tell you if and when you can restart your medicines. He or she will also give you instructions about taking any new medicines.     · If you take aspirin or some other blood thinner, ask your doctor if and when to start taking it again. Make sure that you understand exactly what your doctor wants you to do.     · Your doctor may prescribe a blood-thinning medicine like aspirin or clopidogrel (Plavix). It is very important that you take these medicines exactly as directed in order to keep the coronary artery open and reduce your risk of a heart attack. Be safe with medicines. Call your doctor if you think you are having a problem with your medicine. Care of the catheter site    · For 1 or 2 days, keep a bandage over the spot where the catheter was inserted. The bandage probably will fall off in this time.     · Put ice or a cold pack on the area for 10 to 20 minutes at a time to help with soreness or swelling. Put a thin cloth between the ice and your skin.     · You may shower 24 to 48 hours after the procedure, if your doctor okays it. Pat the incision dry.     · Do not soak the catheter site until it is healed. Don't take a bath for 1 week, or until your doctor tells you it is okay.     · Watch for bleeding from the site. A small amount of blood (up to the size of a quarter) on the bandage can be normal.     · If you are bleeding, lie down and press on the area for 15 minutes to try to make it stop. If the bleeding does not stop, call your doctor or seek immediate medical care. Follow-up care is a key part of your treatment and safety. Be sure to make and go to all appointments, and call your doctor if you are having problems. It's also a good idea to know your test results and keep a list of the medicines you take. When should you call for help? Call 911 anytime you think you may need emergency care. For example, call if:    · You passed out (lost consciousness).     · You have severe trouble breathing.     · You have sudden chest pain and shortness of breath, or you cough up blood.     · You have symptoms of a heart attack. These may include:  ? Chest pain or pressure, or a strange feeling in the chest.  ? Sweating. ? Shortness of breath. ? Nausea or vomiting. ? Pain, pressure, or a strange feeling in the back, neck, jaw, or upper belly, or in one or both shoulders or arms. ? Lightheadedness or sudden weakness. ? A fast or irregular heartbeat. After you call 911, the  may tel you to chew 1 adult-strength or 2 to 4 low-dose aspirin. Wait for an ambulance. Do not try to drive yourself.     · You have been diagnosed with angina, and you have symptoms that do not go away with rest or are not getting better within 5 minutes after you take a dose of nitroglycerin. Call your doctor now or seek immediate medical care if:    · You are bleeding from the area where the catheter was put in your artery.     · You have a fast-growing, painful lump at the catheter site.     · You have signs of infection, such as:  ? Increased pain, swelling, warmth, or redness. ? Red streaks leading from the catheter site. ? Pus draining from the catheter site. ? A fever.     · Your leg, arm, or hand is painful, looks blue, or feels cold, numb, or tingly. Watch closely for changes in your health, and be sure to contact your doctor if you have any problems. Where can you learn more?   Go to http://www.gray.com/  Enter D192 in the search box to learn more about \"Coronary Angiogram: What to Expect at Home. \"  Current as of: April 29, 2021               Content Version: 13.0  © 9043-0364 [employer], [employer]. Care instructions adapted under license by [facility] (which disclaims liability or warranty for this information). If you have questions about a medical condition or this instruction, always ask your healthcare professional. [address] any warranty or liability for your use of this information.

## 2021-11-24 NOTE — PROGRESS NOTES
Patient given discharge instructions. Follow appointments discussed and medications reviewed. Family at bedside. IV removed. Telemetry removed and returned. Patent is ready for discharge at this time. Estimated pickup 1130. Cath card given and instructions. Post cath instructions reviewed. Patient stated Dr. Nighat Salas wanted to see him in two week, added to AVS.  Discharge plan of care/case management plan validated with provider discharge order. 1- Dr. Nighat Salas was called and the patient is cleared for discharge.

## 2021-11-24 NOTE — PROGRESS NOTES
Patient tolerated cardiac cath well yesterday. Stable VS overnight. Discharge home pending labs WNL. Problem: Falls - Risk of  Goal: *Absence of Falls  Description: Document George Reason Fall Risk and appropriate interventions in the flowsheet.   Outcome: Progressing Towards Goal  Note: Fall Risk Interventions:                                Problem: Patient Education: Go to Patient Education Activity  Goal: Patient/Family Education  Outcome: Progressing Towards Goal

## 2021-11-24 NOTE — PROGRESS NOTES
Problem: Falls - Risk of  Goal: *Absence of Falls  Description: Document Massiel Blood Fall Risk and appropriate interventions in the flowsheet. Outcome: Progressing Towards Goal  Patient had no falls during this shift.

## 2021-11-30 NOTE — PROGRESS NOTES
Patient was given printed teaching materials by the cath lab staff after procedure explaining the importance of medication compliance. Patient was also given my contact information and encouraged to contact me if he has any questions or needs further assistance. Patient was given information regarding enrollment into phase 2 outpatient cardiac rehab by cardiac cath lab staff after the procedure, including printed teaching materials about cardiac rehab and my contact information. Patient was informed that someone would contact him to set up an appointment to begin cardiac rehab.    11/29/2021: I did call the patient today to follow up. Patient stated that he has been taking his antiplatelet medication as prescribed. He did not want to schedule an appointment to be evaluated at cardiac rehab. Patient stated that he did participate in cardiac rehab in 2020 and does not want to go again. I did encourage the patient to speak with his cardiologist and that he can be referred if he changes his mind.

## 2021-12-16 ENCOUNTER — HOSPITAL ENCOUNTER (OUTPATIENT)
Age: 66
Setting detail: OBSERVATION
Discharge: HOME | End: 2021-12-17
Attending: EMERGENCY MEDICINE | Admitting: INTERNAL MEDICINE
Payer: MEDICARE

## 2021-12-16 ENCOUNTER — APPOINTMENT (OUTPATIENT)
Dept: GENERAL RADIOLOGY | Age: 66
End: 2021-12-16
Attending: EMERGENCY MEDICINE
Payer: MEDICARE

## 2021-12-16 DIAGNOSIS — R00.1 SYMPTOMATIC BRADYCARDIA: Primary | ICD-10-CM

## 2021-12-16 LAB
ALBUMIN SERPL-MCNC: 3.5 G/DL (ref 3.5–5)
ALBUMIN/GLOB SERPL: 1.1 {RATIO} (ref 1.1–2.2)
ALP SERPL-CCNC: 57 U/L (ref 45–117)
ALT SERPL-CCNC: 22 U/L (ref 12–78)
ANION GAP SERPL CALC-SCNC: 3 MMOL/L (ref 5–15)
AST SERPL W P-5'-P-CCNC: 18 U/L (ref 15–37)
BASOPHILS # BLD: 0.1 K/UL (ref 0–0.1)
BASOPHILS NFR BLD: 1 % (ref 0–1)
BILIRUB SERPL-MCNC: 0.7 MG/DL (ref 0.2–1)
BNP SERPL-MCNC: 1460 PG/ML
BUN SERPL-MCNC: 22 MG/DL (ref 6–20)
BUN/CREAT SERPL: 19 (ref 12–20)
CA-I BLD-MCNC: 8.7 MG/DL (ref 8.5–10.1)
CHLORIDE SERPL-SCNC: 112 MMOL/L (ref 97–108)
CO2 SERPL-SCNC: 28 MMOL/L (ref 21–32)
CREAT SERPL-MCNC: 1.17 MG/DL (ref 0.7–1.3)
DIFFERENTIAL METHOD BLD: ABNORMAL
EOSINOPHIL # BLD: 0.5 K/UL (ref 0–0.4)
EOSINOPHIL NFR BLD: 5 % (ref 0–7)
ERYTHROCYTE [DISTWIDTH] IN BLOOD BY AUTOMATED COUNT: 13.1 % (ref 11.5–14.5)
GLOBULIN SER CALC-MCNC: 3.1 G/DL (ref 2–4)
GLUCOSE SERPL-MCNC: 109 MG/DL (ref 65–100)
HCT VFR BLD AUTO: 42.1 % (ref 36.6–50.3)
HGB BLD-MCNC: 14.1 G/DL (ref 12.1–17)
IMM GRANULOCYTES # BLD AUTO: 0 K/UL (ref 0–0.04)
IMM GRANULOCYTES NFR BLD AUTO: 0 % (ref 0–0.5)
LYMPHOCYTES # BLD: 1.8 K/UL (ref 0.8–3.5)
LYMPHOCYTES NFR BLD: 17 % (ref 12–49)
MAGNESIUM SERPL-MCNC: 2 MG/DL (ref 1.6–2.4)
MCH RBC QN AUTO: 29.8 PG (ref 26–34)
MCHC RBC AUTO-ENTMCNC: 33.5 G/DL (ref 30–36.5)
MCV RBC AUTO: 89 FL (ref 80–99)
MONOCYTES # BLD: 0.5 K/UL (ref 0–1)
MONOCYTES NFR BLD: 5 % (ref 5–13)
NEUTS SEG # BLD: 7.3 K/UL (ref 1.8–8)
NEUTS SEG NFR BLD: 72 % (ref 32–75)
NRBC # BLD: 0 K/UL (ref 0–0.01)
NRBC BLD-RTO: 0 PER 100 WBC
PLATELET # BLD AUTO: 218 K/UL (ref 150–400)
PMV BLD AUTO: 10 FL (ref 8.9–12.9)
POTASSIUM SERPL-SCNC: 4.1 MMOL/L (ref 3.5–5.1)
PROT SERPL-MCNC: 6.6 G/DL (ref 6.4–8.2)
RBC # BLD AUTO: 4.73 M/UL (ref 4.1–5.7)
SODIUM SERPL-SCNC: 143 MMOL/L (ref 136–145)
TROPONIN-HIGH SENSITIVITY: 21 NG/L (ref 0–76)
TROPONIN-HIGH SENSITIVITY: 22 NG/L (ref 0–76)
TSH SERPL DL<=0.05 MIU/L-ACNC: 1.57 UIU/ML (ref 0.36–3.74)
WBC # BLD AUTO: 10.2 K/UL (ref 4.1–11.1)

## 2021-12-16 PROCEDURE — 81001 URINALYSIS AUTO W/SCOPE: CPT

## 2021-12-16 PROCEDURE — 99285 EMERGENCY DEPT VISIT HI MDM: CPT

## 2021-12-16 PROCEDURE — 80053 COMPREHEN METABOLIC PANEL: CPT

## 2021-12-16 PROCEDURE — 84484 ASSAY OF TROPONIN QUANT: CPT

## 2021-12-16 PROCEDURE — G0378 HOSPITAL OBSERVATION PER HR: HCPCS

## 2021-12-16 PROCEDURE — 83735 ASSAY OF MAGNESIUM: CPT

## 2021-12-16 PROCEDURE — 71045 X-RAY EXAM CHEST 1 VIEW: CPT

## 2021-12-16 PROCEDURE — 83880 ASSAY OF NATRIURETIC PEPTIDE: CPT

## 2021-12-16 PROCEDURE — 36415 COLL VENOUS BLD VENIPUNCTURE: CPT

## 2021-12-16 PROCEDURE — 84443 ASSAY THYROID STIM HORMONE: CPT

## 2021-12-16 PROCEDURE — 85025 COMPLETE CBC W/AUTO DIFF WBC: CPT

## 2021-12-16 PROCEDURE — 93005 ELECTROCARDIOGRAM TRACING: CPT

## 2021-12-16 RX ORDER — METOPROLOL SUCCINATE 25 MG/1
50 TABLET, EXTENDED RELEASE ORAL DAILY
Status: DISCONTINUED | OUTPATIENT
Start: 2021-12-17 | End: 2021-12-16

## 2021-12-16 RX ORDER — ACETAMINOPHEN 325 MG/1
650 TABLET ORAL
Status: DISCONTINUED | OUTPATIENT
Start: 2021-12-16 | End: 2021-12-17 | Stop reason: HOSPADM

## 2021-12-16 RX ORDER — ACETAMINOPHEN 650 MG/1
650 SUPPOSITORY RECTAL
Status: DISCONTINUED | OUTPATIENT
Start: 2021-12-16 | End: 2021-12-17 | Stop reason: HOSPADM

## 2021-12-16 RX ORDER — CLOPIDOGREL BISULFATE 75 MG/1
75 TABLET ORAL DAILY
Status: DISCONTINUED | OUTPATIENT
Start: 2021-12-17 | End: 2021-12-17 | Stop reason: HOSPADM

## 2021-12-16 RX ORDER — ATORVASTATIN CALCIUM 10 MG/1
10 TABLET, FILM COATED ORAL DAILY
Status: CANCELLED | OUTPATIENT
Start: 2021-12-17

## 2021-12-16 RX ORDER — SODIUM CHLORIDE 0.9 % (FLUSH) 0.9 %
5-40 SYRINGE (ML) INJECTION EVERY 8 HOURS
Status: DISCONTINUED | OUTPATIENT
Start: 2021-12-16 | End: 2021-12-17 | Stop reason: HOSPADM

## 2021-12-16 RX ORDER — AMLODIPINE BESYLATE 10 MG/1
1 TABLET ORAL DAILY
COMMUNITY
Start: 2021-12-02 | End: 2021-12-17

## 2021-12-16 RX ORDER — ATORVASTATIN CALCIUM 40 MG/1
40 TABLET, FILM COATED ORAL DAILY
Status: DISCONTINUED | OUTPATIENT
Start: 2021-12-17 | End: 2021-12-17 | Stop reason: HOSPADM

## 2021-12-16 RX ORDER — METFORMIN HYDROCHLORIDE 500 MG/1
1000 TABLET ORAL 2 TIMES DAILY
Status: ON HOLD | COMMUNITY
Start: 2021-10-07 | End: 2022-04-21

## 2021-12-16 RX ORDER — ENOXAPARIN SODIUM 100 MG/ML
40 INJECTION SUBCUTANEOUS DAILY
Status: DISCONTINUED | OUTPATIENT
Start: 2021-12-17 | End: 2021-12-17 | Stop reason: HOSPADM

## 2021-12-16 RX ORDER — ONDANSETRON 4 MG/1
4 TABLET, ORALLY DISINTEGRATING ORAL
Status: DISCONTINUED | OUTPATIENT
Start: 2021-12-16 | End: 2021-12-17 | Stop reason: HOSPADM

## 2021-12-16 RX ORDER — ASPIRIN 81 MG/1
81 TABLET ORAL DAILY
Status: DISCONTINUED | OUTPATIENT
Start: 2021-12-17 | End: 2021-12-17 | Stop reason: HOSPADM

## 2021-12-16 RX ORDER — SODIUM CHLORIDE 0.9 % (FLUSH) 0.9 %
5-40 SYRINGE (ML) INJECTION AS NEEDED
Status: DISCONTINUED | OUTPATIENT
Start: 2021-12-16 | End: 2021-12-17 | Stop reason: HOSPADM

## 2021-12-16 RX ORDER — LISINOPRIL 40 MG/1
40 TABLET ORAL DAILY
Status: DISCONTINUED | OUTPATIENT
Start: 2021-12-17 | End: 2021-12-17 | Stop reason: HOSPADM

## 2021-12-16 RX ORDER — ONDANSETRON 2 MG/ML
4 INJECTION INTRAMUSCULAR; INTRAVENOUS
Status: DISCONTINUED | OUTPATIENT
Start: 2021-12-16 | End: 2021-12-17 | Stop reason: HOSPADM

## 2021-12-16 RX ORDER — POLYETHYLENE GLYCOL 3350 17 G/17G
17 POWDER, FOR SOLUTION ORAL DAILY PRN
Status: DISCONTINUED | OUTPATIENT
Start: 2021-12-16 | End: 2021-12-17 | Stop reason: HOSPADM

## 2021-12-16 RX ORDER — ISOSORBIDE MONONITRATE 30 MG/1
1 TABLET, EXTENDED RELEASE ORAL DAILY
COMMUNITY
Start: 2021-12-02 | End: 2021-12-17

## 2021-12-16 RX ORDER — ATORVASTATIN CALCIUM 40 MG/1
1 TABLET, FILM COATED ORAL EVERY EVENING
COMMUNITY
Start: 2021-12-02

## 2021-12-16 RX ORDER — LOSARTAN POTASSIUM 50 MG/1
1 TABLET ORAL DAILY
COMMUNITY
Start: 2021-12-02 | End: 2021-12-17

## 2021-12-16 RX ADMIN — Medication 10 ML: at 22:39

## 2021-12-16 NOTE — ED PROVIDER NOTES
EMERGENCY DEPARTMENT HISTORY AND PHYSICAL EXAM        Date: 12/16/2021  Patient Name: Nitesh Adan    History of Presenting Illness     Chief Complaint   Patient presents with    Slow Heart Rate       History Provided By: Patient    HPI: Nitesh Adan, 78 y.o. male with history of CAD, diabetes, hyperlipidemia, and hypertension who presents with fatigue and shortness of breath. Symptoms started over the last 2 days. He has had exertional fatigue and shortness of breath. States he has been feeling tired lately as well and sleeping a lot. He was moving some equipment that was not very demanding and felt very fatigued after during. States this is not typical for him. He did have a cardiac stent placed last month as well. Denies any chest pain or current shortness of breath. PCP: Beth Alves MD    Current Outpatient Medications   Medication Sig Dispense Refill    lisinopriL (PRINIVIL, ZESTRIL) 40 mg tablet Take 40 mg by mouth daily.  clopidogreL (Plavix) 75 mg tab Take 75 mg by mouth daily.  aspirin delayed-release 81 mg tablet Take 81 mg by mouth daily.  metoprolol succinate (TOPROL-XL) 50 mg XL tablet Take 25 mg by mouth daily.  rosuvastatin (CRESTOR) 10 mg tablet Take 10 mg by mouth nightly.          Past History     Past Medical History:  Past Medical History:   Diagnosis Date    Arthritis     CAD (coronary artery disease)     patient stated he has 2 stents     Diabetes ([address]     Patient stated diet controlled monitors sugar daily not currently on medications    GERD (gastroesophageal reflux disease)     Heart attack [facility])     June 2020    Hyperlipidemia     Hypertension     Ill-defined condition     patient stated approx 2 weeks ago he had a knot in his left leg after wearing tight socks went to his PCP was told it was a blood clot and was told to apply heat he stated it has gotten better stated small knot remains pain had ceased as well as swelling    Sleep apnea     Patient stated he had a CPAP in the past no longer has to use        Past Surgical History:  Past Surgical History:   Procedure Laterality Date    HX COLONOSCOPY      [facility]BDOM[facility]ED      hernia repair times 3    [facility] CARDIAC [facility] PROCEDURE UNLIST      cardiac cath 2020       Family History:  Family History   Problem Relation Age of Onset    Heart Disease Mother     Heart Disease Father        Social History:  Social History     Tobacco Use    Smoking status: Never Smoker    Smokeless tobacco: Never Used   Substance Use Topics    Alcohol use: Never    Drug use: Never       Allergies: Allergies   Allergen Reactions    Penicillins Other (comments)     Patient stated as a child had convulsions, nausea and vomiting            Review of Systems   Review of Systems   Constitutional: Positive for fatigue. Negative for fever. HENT: Negative for congestion. Eyes: Negative for visual disturbance. Respiratory: Positive for shortness of breath. Cardiovascular: Negative for chest pain. Gastrointestinal: Positive for nausea. Negative for abdominal pain and vomiting. Genitourinary: Negative for dysuria. Musculoskeletal: Negative for arthralgias. Skin: Negative for rash. Neurological: Negative for headaches. Physical Exam   Constitutional: No acute distress. Well-nourished. Skin: No rash. ENT: No rhinorrhea. No cough. Head is normocephalic and atraumatic. Eye: No proptosis or conjunctival injections. Respiratory: No apparent respiratory distress. Bradycardic with regular rhythm. No murmurs. Gastrointestinal: Nondistended. Lung sounds are clear. Cardiovascular:  Musculoskeletal: No obvious bony deformities. Psychiatric: Cooperative. Appropriate mood and affect.     Diagnostic Study Results     Labs -     Recent Results (from the past 24 hour(s))   CBC WITH AUTOMATED DIFF    Collection Time: 12/16/21 12:48 PM   Result Value Ref Range    WBC 10.2 4.1 - 11.1 K/uL RBC 4.73 4.10 - 5.70 M/uL    HGB 14.1 12.1 - 17.0 g/dL    HCT 42.1 36.6 - 50.3 %    MCV 89.0 80.0 - 99.0 FL    MCH 29.8 26.0 - 34.0 PG    MCHC 33.5 30.0 - 36.5 g/dL    RDW 13.1 11.5 - 14.5 %    PLATELET 281 889 - 291 K/uL    MPV 10.0 8.9 - 12.9 FL    NRBC 0.0 0.0  WBC    ABSOLUTE NRBC 0.00 0.00 - 0.01 K/uL    NEUTROPHILS 72 32 - 75 %    LYMPHOCYTES 17 12 - 49 %    MONOCYTES 5 5 - 13 %    EOSINOPHILS 5 0 - 7 %    BASOPHILS 1 0 - 1 %    IMMATURE GRANULOCYTES 0 0 - 0.5 %    ABS. NEUTROPHILS 7.3 1.8 - 8.0 K/UL    ABS. LYMPHOCYTES 1.8 0.8 - 3.5 K/UL    ABS. MONOCYTES 0.5 0.0 - 1.0 K/UL    ABS. EOSINOPHILS 0.5 (H) 0.0 - 0.4 K/UL    ABS. BASOPHILS 0.1 0.0 - 0.1 K/UL    ABS. IMM. GRANS. 0.0 0.00 - 0.04 K/UL    DF AUTOMATED     METABOLIC PANEL, COMPREHENSIVE    Collection Time: 12/16/21 12:48 PM   Result Value Ref Range    Sodium 143 136 - 145 mmol/L    Potassium 4.1 3.5 - 5.1 mmol/L    Chloride 112 (H) 97 - 108 mmol/L    CO2 28 21 - 32 mmol/L    Anion gap 3 (L) 5 - 15 mmol/L    Glucose 109 (H) 65 - 100 mg/dL    BUN 22 (H) 6 - 20 mg/dL    Creatinine 1.17 0.70 - 1.30 mg/dL    BUN/Creatinine ratio 19 12 - 20      GFR est AA >60 >60 ml/min/1.73m2    GFR est non-AA >60 >60 ml/min/1.73m2    Calcium 8.7 8.5 - 10.1 mg/dL    Bilirubin, total 0.7 0.2 - 1.0 mg/dL    AST (SGOT) 18 15 - 37 U/L    ALT (SGPT) 22 12 - 78 U/L    Alk. phosphatase 57 45 - 117 U/L    Protein, total 6.6 6.4 - 8.2 g/dL    Albumin 3.5 3.5 - 5.0 g/dL    Globulin 3.1 2.0 - 4.0 g/dL    A-G Ratio 1.1 1.1 - 2.2     TROPONIN-HIGH SENSITIVITY    Collection Time: 12/16/21 12:48 PM   Result Value Ref Range    Troponin-High Sensitivity 21 0 - 76 ng/L   NT-PRO BNP    Collection Time: 12/16/21 12:48 PM   Result Value Ref Range    NT pro-BNP 1,460 (H) <125 pg/mL       Radiologic Studies -   XR CHEST PORT   Final Result   1. There are bridging right lateral osteophytes at contiguous levels along the   thoracic spine related to diffuse idiopathic skeletal hyperostosis. 2.  The remainder of this examination is within normal limits without an acute   cardiopulmonary process. CT Results  (Last 48 hours)    None        CXR Results  (Last 48 hours)               12/16/21 1354  XR CHEST PORT Final result    Impression:  1. There are bridging right lateral osteophytes at contiguous levels along the   thoracic spine related to diffuse idiopathic skeletal hyperostosis. 2.  The remainder of this examination is within normal limits without an acute   cardiopulmonary process. Narrative: The study is a single view chest radiographic examination dated 12/16/2021   obtained at 1:45 PM.. HISTORY: Shortness of breath. COMPARISON:  6/20/2020. FINDINGS: The heart size is normal. The pulmonary vessels are normal in caliber. The lung parenchyma is clear without an infiltrate or atelectasis. The   costophrenic angles are maintained without pleural effusions or a pneumothorax. There is a midline position to the trachea. The pulmonary bobby are symmetrical.       The bony thorax is intact without fractures/acute osseous abnormalities. There   are bridging right lateral osteophytes at contiguous levels along the thoracic   spine related to diffuse idiopathic skeletal hyperostosis. Medical Decision Making and ED Course     I reviewed the available vital signs, nursing notes, past medical history, past surgical history, family history, and social history. Vital Signs - Reviewed the patient's vital signs. Patient Vitals for the past 12 hrs:   Temp Pulse Resp BP SpO2   12/16/21 1618 98.2 °F (36.8 °C) (!) 46 20 (!) 117/58 96 %   12/16/21 1425  (!) 54 20 (!) 140/72 97 %   12/16/21 1259     100 %   12/16/21 1250 98 °F (36.7 °C) (!) 52 18 134/62 98 %   12/16/21 1249 98 °F (36.7 °C) (!) 56 20 134/62 98 %       EKG interpretation: Obtained on 12/16/2021 at 1252. Read 1257.   Sinus bradycardia with first-degree AV block at rate of 51 bpm.  AR interval 220 ms. QRS duration 82 ms. QTc 423 ms. Left axis deviation. No ST segment abnormalities. Medical Decision Making:   Presented with bradycardia, shortness of breath, fatigue. The differential diagnosis is symptomatic bradycardia, heart block, medication reaction, ACS, sick sinus syndrome. Work-up unremarkable except for sinus bradycardia. Patient has been having symptoms of bradycardia as well. I feel would be better to admit him for cardiac evaluation. He has been taking metoprolol 25 mg. This was recently decreased. This could be possibly a cause. Discussed with hospitalist and will need for further care and evaluation. Disposition     Admitted       Diagnosis     Clinical impression:   1. Symptomatic bradycardia           Attestation:  Please note that this dictation was completed with CHORD, the computer voice recognition software. Quite often unanticipated grammatical, syntax, homophones, and other interpretive errors are inadvertently transcribed by the computer software. Please disregard these errors. Please excuse any errors that have escaped final proofreading. Thank you.   Elke Pickens, DO

## 2021-12-16 NOTE — ED TRIAGE NOTES
Sent from [facility] for symptomatic bradycardia. Pt presented with SOB and fatigue. A&O during triage. Denies hx of same.

## 2021-12-16 NOTE — H&P
History and Physical    Patient: Michael Aviles MRN: [id number]  SSN: [id number]    YOB: 1955  Age: 78 y.o. Sex: male      Subjective:      Michael Aviles is a 78 y.o. male with a PMHx of CAD s/p stent with Dr. Rocklin Babinski 1 month ago, diabetes mellitus, hyperlipidemia, and hypertension presenting to the ED with a primary complaint of fatigue that has progressively worsened over the last 3 days. Patient also report dyspnea on exertion. He states he has not been sleeping well lately. He is typically very active moving objects and chopping wood. He denies any dizziness, fever, chills, cough, palpitation, chest pain, nausea, vomiting, or extremity edema. In the ED, noted to be bradycardic with HR 41 bpm. Labs showing mildly elevated BNP. Chest x-ray negative for acute cardiopulmonary infiltrates. Cardiology consult. Admit patient to telemetry floor for bradycardia and cardiac monitoring.      Past Medical History:   Diagnosis Date    Arthritis     CAD (coronary artery disease)     patient stated he has 2 stents     Diabetes [facility])     Patient stated diet controlled monitors sugar daily not currently on medications    GERD (gastroesophageal reflux disease)     Heart attack [facility])     June 2020    Hyperlipidemia     Hypertension     Ill-defined condition     patient stated approx 2 weeks ago he had a knot in his left leg after wearing tight socks went to his PCP was told it was a blood clot and was told to apply heat he stated it has gotten better stated small knot remains pain had ceased as well as swelling    Sleep apnea     Patient stated he had a CPAP in the past no longer has to use      Past Surgical History:   Procedure Laterality Date    HX COLONOSCOPY      LA ABDOMEN SURGERY PROC UNLISTED      hernia repair times 3    LA CARDIAC SURG PROCEDURE UNLIST      cardiac cath 2020      Family History   Problem Relation Age of Onset    Heart Disease Mother     Heart Disease Father      Social History     Tobacco Use    Smoking status: Never Smoker    Smokeless tobacco: Never Used   Substance Use Topics    Alcohol use: Never      Prior to Admission medications    Medication Sig Start Date End Date Taking? Authorizing Provider   lisinopriL (PRINIVIL, ZESTRIL) 40 mg tablet Take 40 mg by mouth daily. Provider, Historical   clopidogreL (Plavix) 75 mg tab Take 75 mg by mouth daily. Provider, Historical   aspirin delayed-release 81 mg tablet Take 81 mg by mouth daily. Provider, Historical   metoprolol succinate (TOPROL-XL) 50 mg XL tablet Take 25 mg by mouth daily. Provider, Historical   rosuvastatin (CRESTOR) 10 mg tablet Take 10 mg by mouth nightly. Provider, Historical        Allergies   Allergen Reactions    Penicillins Other (comments)     Patient stated as a child had convulsions, nausea and vomiting        Review of Systems:  Constitutional: + fatigue, No fevers, No chills, No weakness  Eyes: No visual disturbance  ENT: No nasal congestion, No sore throat  Respiratory: No cough, No sputum, No wheezing, + SOB  Cardiovascular: + dyspnea on exertion, No chest pain, No lower extremity edema, No Palpitations   Gastrointestinal: No nausea, No vomiting, No diarrhea, No constipation, No abdominal pain  Genitourinary: No frequency, No dysuria, No hematuria  Integument/Breast: No rash, No skin lesion(s), No dryness  Musculoskeletal: No arthralgias, No neck pain, No back pain  Neurological: No headaches, No dizziness, No confusion,  No seizures  Behavioral/Psychiatric: No anxiety, No depression      Objective:     Vitals:    12/16/21 1330 12/16/21 1425 12/16/21 1530 12/16/21 1618   BP: 112/62 (!) 140/72 (!) 116/56 (!) 117/58   Pulse: (!) 54 (!) 54 (!) 54 (!) 46   Resp: 20 20 20 20   Temp: 98.6 °F (37 °C)   98.2 °F (36.8 °C)   SpO2: 100% 97% 98% 96%   Weight:       Height:            Physical Exam:  General: Well-appearing male.  Alert, cooperative, no distress  Eye: conjunctivae/corneas clear. PERRL, EOM's intact. Throat and Neck: normal and no erythema or exudates noted. No mass   Lung: clear to auscultation bilaterally. Room air. Heart: Bradycardia. Regular rhythm, +S1/S2. No murmur or gallop. Abdomen: soft, non-tender. Bowel sounds normal. No masses,  Extremities:  No peripheral edema. Able to move all extremities normal, atraumatic  Skin: Multiple areas of excoriations on extremity. Neurologic: AOx3. Cranial nerves 2-12 and sensation grossly intact. Psychiatric: non focal    Recent Results (from the past 24 hour(s))   CBC WITH AUTOMATED DIFF    Collection Time: 12/16/21 12:48 PM   Result Value Ref Range    WBC 10.2 4.1 - 11.1 K/uL    RBC 4.73 4.10 - 5.70 M/uL    HGB 14.1 12.1 - 17.0 g/dL    HCT 42.1 36.6 - 50.3 %    MCV 89.0 80.0 - 99.0 FL    MCH 29.8 26.0 - 34.0 PG    MCHC 33.5 30.0 - 36.5 g/dL    RDW 13.1 11.5 - 14.5 %    PLATELET 615 198 - 977 K/uL    MPV 10.0 8.9 - 12.9 FL    NRBC 0.0 0.0  WBC    ABSOLUTE NRBC 0.00 0.00 - 0.01 K/uL    NEUTROPHILS 72 32 - 75 %    LYMPHOCYTES 17 12 - 49 %    MONOCYTES 5 5 - 13 %    EOSINOPHILS 5 0 - 7 %    BASOPHILS 1 0 - 1 %    IMMATURE GRANULOCYTES 0 0 - 0.5 %    ABS. NEUTROPHILS 7.3 1.8 - 8.0 K/UL    ABS. LYMPHOCYTES 1.8 0.8 - 3.5 K/UL    ABS. MONOCYTES 0.5 0.0 - 1.0 K/UL    ABS. EOSINOPHILS 0.5 (H) 0.0 - 0.4 K/UL    ABS. BASOPHILS 0.1 0.0 - 0.1 K/UL    ABS. IMM.  GRANS. 0.0 0.00 - 0.04 K/UL    DF AUTOMATED     METABOLIC PANEL, COMPREHENSIVE    Collection Time: 12/16/21 12:48 PM   Result Value Ref Range    Sodium 143 136 - 145 mmol/L    Potassium 4.1 3.5 - 5.1 mmol/L    Chloride 112 (H) 97 - 108 mmol/L    CO2 28 21 - 32 mmol/L    Anion gap 3 (L) 5 - 15 mmol/L    Glucose 109 (H) 65 - 100 mg/dL    BUN 22 (H) 6 - 20 mg/dL    Creatinine 1.17 0.70 - 1.30 mg/dL    BUN/Creatinine ratio 19 12 - 20      GFR est AA >60 >60 ml/min/1.73m2    GFR est non-AA >60 >60 ml/min/1.73m2    Calcium 8.7 8.5 - 10.1 mg/dL    Bilirubin, total 0.7 0.2 - 1.0 mg/dL    AST (SGOT) 18 15 - 37 U/L    ALT (SGPT) 22 12 - 78 U/L    Alk. phosphatase 57 45 - 117 U/L    Protein, total 6.6 6.4 - 8.2 g/dL    Albumin 3.5 3.5 - 5.0 g/dL    Globulin 3.1 2.0 - 4.0 g/dL    A-G Ratio 1.1 1.1 - 2.2     TROPONIN-HIGH SENSITIVITY    Collection Time: 12/16/21 12:48 PM   Result Value Ref Range    Troponin-High Sensitivity 21 0 - 76 ng/L   NT-PRO BNP    Collection Time: 12/16/21 12:48 PM   Result Value Ref Range    NT pro-BNP 1,460 (H) <125 pg/mL       XR Results (maximum last 3): Results from Hospital Encounter encounter on 12/16/21    XR CHEST PORT    Narrative  The study is a single view chest radiographic examination dated 12/16/2021  obtained at 1:45 PM.. HISTORY: Shortness of breath. COMPARISON:  6/20/2020. FINDINGS: The heart size is normal. The pulmonary vessels are normal in caliber. The lung parenchyma is clear without an infiltrate or atelectasis. The  costophrenic angles are maintained without pleural effusions or a pneumothorax. There is a midline position to the trachea. The pulmonary bobby are symmetrical.    The bony thorax is intact without fractures/acute osseous abnormalities. There  are bridging right lateral osteophytes at contiguous levels along the thoracic  spine related to diffuse idiopathic skeletal hyperostosis. Impression  1. There are bridging right lateral osteophytes at contiguous levels along the  thoracic spine related to diffuse idiopathic skeletal hyperostosis. 2.  The remainder of this examination is within normal limits without an acute  cardiopulmonary process. CT Results (maximum last 3): No results found for this or any previous visit. MRI Results (maximum last 3): No results found for this or any previous visit. Nuclear Medicine Results (maximum last 3): No results found for this or any previous visit. US Results (maximum last 3): No results found for this or any previous visit.       Active Problems: Bradycardia (12/16/2021)        Assessment/Plan:     1. Bradycardia   2. Fatigue  - S/p cardiac stent 1 mo ago  - EKG showing bradycardia and 1st degree AV block  - Chest x-ray negative for acute cardiopulmonary infiltrates  - Cardiology consult. Follows with Dr. Ellen Spencer. - Cardiac monitoring  - Check TSH  - Hold home metoprolol and lisinopril    3. Hx CAD s/p stent   - Continue duel anti-platelet therapy    4. Hypertension  - Stable. Hold home anti-hypertensive for now. 5. Hyperlipidemia  - Continue statin    6. Diabetes mellitus  - Diet-controlled. HgbA1c pending.     DVT Prophylaxis - Lovenox  Code Status - Full  POA    Total Time >55 minutes      Signed By: Gali Rojas PA-C     December 16, 2021

## 2021-12-16 NOTE — ROUTINE PROCESS
TRANSFER - OUT REPORT:    Verbal report given to unique on Nitesh Adan  being transferred to [facility] for routine progression of care       Report consisted of patients Situation, Background, Assessment and   Recommendations(SBAR). Information from the following report(s) SBAR was reviewed with the receiving nurse. Lines:       Opportunity for questions and clarification was provided.       Patient transported with:   Adayana

## 2021-12-17 VITALS
WEIGHT: 182 LBS | HEART RATE: 56 BPM | BODY MASS INDEX: 26.05 KG/M2 | DIASTOLIC BLOOD PRESSURE: 74 MMHG | TEMPERATURE: 97.8 F | HEIGHT: 70 IN | RESPIRATION RATE: 17 BRPM | SYSTOLIC BLOOD PRESSURE: 141 MMHG | OXYGEN SATURATION: 97 %

## 2021-12-17 LAB
ANION GAP SERPL CALC-SCNC: 6 MMOL/L (ref 5–15)
APPEARANCE UR: CLEAR
ATRIAL RATE: 51 BPM
BACTERIA URNS QL MICRO: NEGATIVE /HPF
BILIRUB UR QL: NEGATIVE
BUN SERPL-MCNC: 25 MG/DL (ref 6–20)
BUN/CREAT SERPL: 20 (ref 12–20)
CA-I BLD-MCNC: 8.7 MG/DL (ref 8.5–10.1)
CALCULATED P AXIS, ECG09: 66 DEGREES
CALCULATED R AXIS, ECG10: -30 DEGREES
CALCULATED T AXIS, ECG11: -86 DEGREES
CHLORIDE SERPL-SCNC: 110 MMOL/L (ref 97–108)
CO2 SERPL-SCNC: 28 MMOL/L (ref 21–32)
COLOR UR: ABNORMAL
CREAT SERPL-MCNC: 1.24 MG/DL (ref 0.7–1.3)
DIAGNOSIS, 93000: NORMAL
ERYTHROCYTE [DISTWIDTH] IN BLOOD BY AUTOMATED COUNT: 13 % (ref 11.5–14.5)
GLUCOSE SERPL-MCNC: 187 MG/DL (ref 65–100)
GLUCOSE UR STRIP.AUTO-MCNC: NEGATIVE MG/DL
HCT VFR BLD AUTO: 42.8 % (ref 36.6–50.3)
HGB BLD-MCNC: 14.4 G/DL (ref 12.1–17)
HGB UR QL STRIP: ABNORMAL
KETONES UR QL STRIP.AUTO: NEGATIVE MG/DL
LEUKOCYTE ESTERASE UR QL STRIP.AUTO: NEGATIVE
MCH RBC QN AUTO: 29.9 PG (ref 26–34)
MCHC RBC AUTO-ENTMCNC: 33.6 G/DL (ref 30–36.5)
MCV RBC AUTO: 89 FL (ref 80–99)
MUCOUS THREADS URNS QL MICRO: ABNORMAL /LPF
NITRITE UR QL STRIP.AUTO: NEGATIVE
NRBC # BLD: 0 K/UL (ref 0–0.01)
NRBC BLD-RTO: 0 PER 100 WBC
P-R INTERVAL, ECG05: 220 MS
PH UR STRIP: 5 [PH] (ref 5–8)
PLATELET # BLD AUTO: 207 K/UL (ref 150–400)
PMV BLD AUTO: 10.4 FL (ref 8.9–12.9)
POTASSIUM SERPL-SCNC: 3.6 MMOL/L (ref 3.5–5.1)
PROT UR STRIP-MCNC: NEGATIVE MG/DL
Q-T INTERVAL, ECG07: 460 MS
QRS DURATION, ECG06: 82 MS
QTC CALCULATION (BEZET), ECG08: 423 MS
RBC # BLD AUTO: 4.81 M/UL (ref 4.1–5.7)
RBC #/AREA URNS HPF: ABNORMAL /HPF (ref 0–5)
SODIUM SERPL-SCNC: 144 MMOL/L (ref 136–145)
SP GR UR REFRACTOMETRY: 1.02 (ref 1–1.03)
UROBILINOGEN UR QL STRIP.AUTO: 0.1 EU/DL (ref 0.1–1)
VENTRICULAR RATE, ECG03: 51 BPM
WBC # BLD AUTO: 10 K/UL (ref 4.1–11.1)
WBC URNS QL MICRO: ABNORMAL /HPF (ref 0–4)

## 2021-12-17 PROCEDURE — 74011250637 HC RX REV CODE- 250/637: Performed by: INTERNAL MEDICINE

## 2021-12-17 PROCEDURE — G0378 HOSPITAL OBSERVATION PER HR: HCPCS

## 2021-12-17 PROCEDURE — 36415 COLL VENOUS BLD VENIPUNCTURE: CPT

## 2021-12-17 PROCEDURE — 74011250637 HC RX REV CODE- 250/637: Performed by: STUDENT IN AN ORGANIZED HEALTH CARE EDUCATION/TRAINING PROGRAM

## 2021-12-17 PROCEDURE — 80048 BASIC METABOLIC PNL TOTAL CA: CPT

## 2021-12-17 PROCEDURE — 85027 COMPLETE CBC AUTOMATED: CPT

## 2021-12-17 RX ORDER — AMLODIPINE BESYLATE 5 MG/1
10 TABLET ORAL DAILY
Status: DISCONTINUED | OUTPATIENT
Start: 2021-12-18 | End: 2021-12-17

## 2021-12-17 RX ORDER — POTASSIUM CHLORIDE 1.5 G/1.77G
40 POWDER, FOR SOLUTION ORAL
Status: DISCONTINUED | OUTPATIENT
Start: 2021-12-17 | End: 2021-12-17 | Stop reason: HOSPADM

## 2021-12-17 RX ORDER — LISINOPRIL 40 MG/1
40 TABLET ORAL DAILY
Qty: 30 TABLET | Refills: 0 | Status: SHIPPED | OUTPATIENT
Start: 2021-12-17

## 2021-12-17 RX ADMIN — ASPIRIN 81 MG: 81 TABLET, COATED ORAL at 09:53

## 2021-12-17 RX ADMIN — Medication 10 ML: at 06:34

## 2021-12-17 RX ADMIN — ATORVASTATIN CALCIUM 40 MG: 40 TABLET, FILM COATED ORAL at 09:53

## 2021-12-17 RX ADMIN — CLOPIDOGREL BISULFATE 75 MG: 75 TABLET ORAL at 09:53

## 2021-12-17 NOTE — PROGRESS NOTES
Medicare Outpatient Observation Notice (MOON) provided to patient/representative with verbal explanation of the notice. Time allotted for questions regarding the notice. Patient /representative provided a completed copy of the MOON notice. Patient informed CM that he lives at home with his spouse and she will provide transportation upon DC.

## 2021-12-17 NOTE — ROUTINE PROCESS
Dual skin assessment revealed some dry, itchy patches of skin scattered around patients body. Some spots have scabbed over from repeated scratching. The spots are sparse, but they appear on patient's lower back, upper chest, and arms. Patient states he does not know what caused the itchy spots but that they have been healing and appearing less often over the last few weeks. Patient also stated he has visited a dermatologist about the issue and that he was given a moisturizing cream to apply to the areas that has helped.

## 2021-12-17 NOTE — CONSULTS
Consult    Patient: Tomer Cole MRN: [id number]  SSN: [id number]    YOB: 1955  Age: 78 y.o. Sex: male       Subjective:      Date of  Admission: 12/16/2021     Admission type: Emergency    Tomer Cole is a 78 y.o. male with a history of coronary artery disease status post PCI, diabetes mellitus, hypertension, hyperlipidemia who was sent from the office yesterday where he presented complaining of severe fatigue. His heart rate was in the 40s. He is well-known to me from prior office visits and has had this problem with Toprol due to which it was stopped however he had been taking it again due to unclear reasons. He feels well today. Telemetry shows normal sinus rhythm with heart rate in the 60s. His presenting symptoms have essentially resolved. There is no history of chest pain, dyspnea on exertion or any other complaints.     Primary Care Provider: Sunil Rios MD  Past Medical History:   Diagnosis Date    Arthritis     CAD (coronary artery disease)     patient stated he has 2 stents     Diabetes [facility])     Patient stated diet controlled monitors sugar daily not currently on medications    GERD (gastroesophageal reflux disease)     Heart attack [facility])     June 2020    Hyperlipidemia     Hypertension     Ill-defined condition     patient stated approx 2 weeks ago he had a knot in his left leg after wearing tight socks went to his PCP was told it was a blood clot and was told to apply heat he stated it has gotten better stated small knot remains pain had ceased as well as swelling    Sleep apnea     Patient stated he had a CPAP in the past no longer has to use       Past Surgical History:   Procedure Laterality Date    HX COLONOSCOPY      HX ORTHOPAEDIC      pt states he's had 4 back surgeries    MA ABDOMEN SURGERY PROC UNLISTED      hernia repair times 3    MA CARDIAC SURG PROCEDURE UNLIST      cardiac cath 2020     Family History   Problem Relation Age of Onset    Heart Disease Mother     Heart Disease Father       Social History     Tobacco Use    Smoking status: Former Smoker     Packs/day: 0.50    Smokeless tobacco: Never Used   Substance Use Topics    Alcohol use: Not Currently     Comment: Used to only drink on special occassions      Current Facility-Administered Medications   Medication Dose Route Frequency    potassium chloride (KLOR-CON) packet for solution 40 mEq  40 mEq Oral NOW    aspirin delayed-release tablet 81 mg  81 mg Oral DAILY    clopidogreL (PLAVIX) tablet 75 mg  75 mg Oral DAILY    lisinopriL (PRINIVIL, ZESTRIL) tablet 40 mg  40 mg Oral DAILY    sodium chloride (NS) flush 5-40 mL  5-40 mL IntraVENous Q8H    sodium chloride (NS) flush 5-40 mL  5-40 mL IntraVENous PRN    acetaminophen (TYLENOL) tablet 650 mg  650 mg Oral Q6H PRN    Or    acetaminophen (TYLENOL) suppository 650 mg  650 mg Rectal Q6H PRN    polyethylene glycol (MIRALAX) packet 17 g  17 g Oral DAILY PRN    ondansetron (ZOFRAN ODT) tablet 4 mg  4 mg Oral Q8H PRN    Or    ondansetron (ZOFRAN) injection 4 mg  4 mg IntraVENous Q6H PRN    enoxaparin (LOVENOX) injection 40 mg  40 mg SubCUTAneous DAILY    atorvastatin (LIPITOR) tablet 40 mg  40 mg Oral DAILY        Allergies   Allergen Reactions    Penicillins Other (comments)     Patient stated as a child had convulsions, nausea and vomiting         Review of Systems:  A comprehensive review of systems was negative except for that written in the History of Present Illness.        Subjective:     Visit Vitals  BP (!) 141/74 (BP 1 Location: Left upper arm, BP Patient Position: Supine)   Pulse (!) 56   Temp 97.8 °F (36.6 °C)   Resp 17   Ht 5' 10\" (1.778 m)   Wt 82.6 kg (182 lb)   SpO2 97%   BMI 26.11 kg/m²        Physical Exam:  Visit Vitals  BP (!) 141/74 (BP 1 Location: Left upper arm, BP Patient Position: Supine)   Pulse (!) 56   Temp 97.8 °F (36.6 °C)   Resp 17   Ht 5' 10\" (1.778 m)   Wt 82.6 kg (182 lb)   SpO2 97%   BMI 26.11 kg/m²     General Appearance:  Well developed, well nourished,alert and oriented x 3, and individual in no acute distress. Ears/Nose/Mouth/Throat:   Hearing grossly normal.         Neck: Supple. Chest:   Lungs clear to auscultation bilaterally. Cardiovascular:  Regular rate and rhythm, S1, S2 normal, no murmur. Abdomen:   Soft, non-tender, bowel sounds are active. Extremities: No edema bilaterally. Skin: Warm and dry.                Cardiographics:  Telemetry: normal sinus rhythm    Data Reviewed:   BMP:   Lab Results   Component Value Date/Time     12/17/2021 09:15 AM    K 3.6 12/17/2021 09:15 AM     (H) 12/17/2021 09:15 AM    CO2 28 12/17/2021 09:15 AM    AGAP 6 12/17/2021 09:15 AM     (H) 12/17/2021 09:15 AM    BUN 25 (H) 12/17/2021 09:15 AM    CREA 1.24 12/17/2021 09:15 AM    GFRAA >60 12/17/2021 09:15 AM    GFRNA 58 (L) 12/17/2021 09:15 AM     CMP:   Lab Results   Component Value Date/Time     12/17/2021 09:15 AM    K 3.6 12/17/2021 09:15 AM     (H) 12/17/2021 09:15 AM    CO2 28 12/17/2021 09:15 AM    AGAP 6 12/17/2021 09:15 AM     (H) 12/17/2021 09:15 AM    BUN 25 (H) 12/17/2021 09:15 AM    CREA 1.24 12/17/2021 09:15 AM    GFRAA >60 12/17/2021 09:15 AM    GFRNA 58 (L) 12/17/2021 09:15 AM    CA 8.7 12/17/2021 09:15 AM    MG 2.0 12/16/2021 07:09 PM    ALB 3.5 12/16/2021 12:48 PM    TP 6.6 12/16/2021 12:48 PM    GLOB 3.1 12/16/2021 12:48 PM    AGRAT 1.1 12/16/2021 12:48 PM    ALT 22 12/16/2021 12:48 PM     CBC:   Lab Results   Component Value Date/Time    WBC 10.0 12/17/2021 09:15 AM    HGB 14.4 12/17/2021 09:15 AM    HCT 42.8 12/17/2021 09:15 AM     12/17/2021 09:15 AM     All Cardiac Markers in the last 24 hours: No results found for: CPK, CK, CKMMB, CKMB, RCK3, CKMBT, CKNDX, CKND1, CLARKE, TROPT, TROIQ, SÁNCHEZ, TROPT, TNIPOC, BNP, BNPP  Recent Glucose Results:   Lab Results   Component Value Date/Time     (H) 12/17/2021 09:15 AM     (H) 12/16/2021 12:48 PM     ABG: No results found for: PH, PHI, PCO2, PCO2I, PO2, PO2I, HCO3, HCO3I, FIO2, FIO2I  COAGS: No results found for: APTT, PTP, INR, INREXT, INREXT     Assessment:   Sinus Bradycardia  Coronary Artery Disease s/p PCI  Diabetes Mellitus  Hypertension  Hyperlipidemia     Plan:   65-year-old male with past medical history as above who is seen for sinus bradycardia  -As mentioned above, patient has had this problem in the past with Toprol as well. Unclear why/how he has been on Toprol again. I have discontinued it for now  -Continue dual antiplatelet therapy with recent history of PCI.   Continue aspirin, Plavix  -Continue ACE inhibitor  -Outpatient follow-up with Dr. Xuan Hay in 2 to 3 weeks to optimize medications for better blood pressure control  -Discussed with primary team    Thank you for allowing us to participate in the care of your patient

## 2021-12-17 NOTE — DISCHARGE SUMMARY
Hospitalist Discharge Summary     Patient ID:  Yuliana Thomas  [id number]  89 y.o.  1955  12/16/2021    PCP on record: Mildred Blanco MD    Admit date: 12/16/2021  Discharge date and time: 12/17/2021    DISCHARGE DIAGNOSIS:    Symptomatic bradycardia    CONSULTATIONS:  IP CONSULT TO CARDIOLOGY    Excerpted HPI from H&P of Earnest Wolf MD:  78 y.o. male with a PMHx of CAD s/p stent with Dr. Chago Trevino 1 month ago, diabetes mellitus, hyperlipidemia, and hypertension presenting to the ED with a primary complaint of fatigue that has progressively worsened over the last 3 days. Patient also report dyspnea on exertion. He states he has not been sleeping well lately. He is typically very active moving objects and chopping wood. He denies any dizziness, fever, chills, cough, palpitation, chest pain, nausea, vomiting, or extremity edema.     ______________________________________________________________________  DISCHARGE SUMMARY/HOSPITAL COURSE:  for full details see H&P, daily progress notes, labs, consult notes. Cardiology was consulted. Patient was bradycardic with metoprolol and it was stopped in the past, however, due to unclear reason patient was taking metoprolol. Metoprolol was held, patient's heart rate improved and patient felt better. Patient was cleared by cardiology for discharge. Patient is stable for discharge        _______________________________________________________________________  Patient seen and examined by me on discharge day. Pertinent Findings:  Gen:    Not in distress  Chest: Clear lungs  CVS:   Regular rhythm. No edema  Abd:  Soft, not distended, not tender  Neuro:  Alert, oriented  _______________________________________________________________________  DISCHARGE MEDICATIONS:   Discharge Medication List as of 12/17/2021 11:23 AM      CONTINUE these medications which have CHANGED    Details   lisinopriL (PRINIVIL, ZESTRIL) 40 mg tablet Take 1 Tablet by mouth daily. , Normal, Disp-30 Tablet, R-0         CONTINUE these medications which have NOT CHANGED    Details   metFORMIN (GLUCOPHAGE) 500 mg tablet Take 1,000 mg by mouth two (2) times a day., Historical Med      atorvastatin (LIPITOR) 40 mg tablet Take 1 Tablet by mouth every evening., Historical Med      clopidogreL (Plavix) 75 mg tab Take 75 mg by mouth daily. , Historical Med      aspirin delayed-release 81 mg tablet Take 81 mg by mouth daily. , Historical Med         STOP taking these medications       amLODIPine (NORVASC) 10 mg tablet Comments:   Reason for Stopping:         isosorbide mononitrate ER (IMDUR) 30 mg tablet Comments:   Reason for Stopping:         losartan (COZAAR) 50 mg tablet Comments:   Reason for Stopping:         metoprolol succinate (TOPROL-XL) 50 mg XL tablet Comments:   Reason for Stopping:         rosuvastatin (CRESTOR) 10 mg tablet Comments:   Reason for Stopping:                 Patient Follow Up Instructions: Activity: Activity as tolerated  Diet: Cardiac Diet  Wound Care: None needed    Follow-up with PCP, cardiology in 1 week.   Follow-up tests/labs none  Follow-up Information     Follow up With Specialties Details Why Contact Saul Crenshaw MD Family Medicine   [address] [place] [address],[address]  [phone] [phone]          ________________________________________________________________    Risk of deterioration: Moderate    Condition at Discharge:  Stable  __________________________________________________________________    Disposition  Home with family, no needs    ____________________________________________________________________    Code Status: Full Code  ___________________________________________________________________      Total time in minutes spent coordinating this discharge (includes going over instructions, follow-up, prescriptions, and preparing report for sign off to her PCP) :  35 minutes    Signed:  Arianne Hernandes MD

## 2022-02-28 ENCOUNTER — HOSPITAL ENCOUNTER (OUTPATIENT)
Dept: LAB | Age: 67
Discharge: HOME | End: 2022-02-28
Payer: MEDICARE

## 2022-02-28 ENCOUNTER — TRANSCRIBE ORDER (OUTPATIENT)
Dept: REGISTRATION | Age: 67
End: 2022-02-28

## 2022-02-28 DIAGNOSIS — I50.9 HEART FAILURE, UNSPECIFIED (HCC): ICD-10-CM

## 2022-02-28 DIAGNOSIS — E11.9 DIABETES MELLITUS (HCC): ICD-10-CM

## 2022-02-28 DIAGNOSIS — I50.9 HEART FAILURE, UNSPECIFIED (HCC): Primary | ICD-10-CM

## 2022-02-28 LAB
ALBUMIN SERPL-MCNC: 3.9 G/DL (ref 3.5–5)
ALBUMIN/GLOB SERPL: 1.1 {RATIO} (ref 1.1–2.2)
ALP SERPL-CCNC: 65 U/L (ref 45–117)
ALT SERPL-CCNC: 22 U/L (ref 12–78)
ANION GAP SERPL CALC-SCNC: 6 MMOL/L (ref 5–15)
APPEARANCE UR: CLEAR
AST SERPL W P-5'-P-CCNC: 21 U/L (ref 15–37)
BACTERIA URNS QL MICRO: NEGATIVE /HPF
BASOPHILS # BLD: 0.1 K/UL (ref 0–0.1)
BASOPHILS NFR BLD: 1 % (ref 0–1)
BILIRUB SERPL-MCNC: 0.9 MG/DL (ref 0.2–1)
BILIRUB UR QL: NEGATIVE
BUN SERPL-MCNC: 28 MG/DL (ref 6–20)
BUN/CREAT SERPL: 19 (ref 12–20)
CA-I BLD-MCNC: 9.4 MG/DL (ref 8.5–10.1)
CHLORIDE SERPL-SCNC: 108 MMOL/L (ref 97–108)
CHOLEST SERPL-MCNC: 149 MG/DL
CO2 SERPL-SCNC: 29 MMOL/L (ref 21–32)
COLOR UR: ABNORMAL
CREAT SERPL-MCNC: 1.48 MG/DL (ref 0.7–1.3)
CREAT UR-MCNC: 321 MG/DL
DIFFERENTIAL METHOD BLD: ABNORMAL
EOSINOPHIL # BLD: 0.5 K/UL (ref 0–0.4)
EOSINOPHIL NFR BLD: 5 % (ref 0–7)
ERYTHROCYTE [DISTWIDTH] IN BLOOD BY AUTOMATED COUNT: 13.2 % (ref 11.5–14.5)
EST. AVERAGE GLUCOSE BLD GHB EST-MCNC: 126 MG/DL
GLOBULIN SER CALC-MCNC: 3.4 G/DL (ref 2–4)
GLUCOSE SERPL-MCNC: 129 MG/DL (ref 65–100)
GLUCOSE UR STRIP.AUTO-MCNC: NEGATIVE MG/DL
HBA1C MFR BLD: 6 % (ref 4–5.6)
HCT VFR BLD AUTO: 45.5 % (ref 36.6–50.3)
HDLC SERPL-MCNC: 30 MG/DL
HDLC SERPL: 5 {RATIO} (ref 0–5)
HGB BLD-MCNC: 15.4 G/DL (ref 12.1–17)
HGB UR QL STRIP: ABNORMAL
IMM GRANULOCYTES # BLD AUTO: 0 K/UL (ref 0–0.04)
IMM GRANULOCYTES NFR BLD AUTO: 0 % (ref 0–0.5)
KETONES UR QL STRIP.AUTO: NEGATIVE MG/DL
LDLC SERPL CALC-MCNC: 85 MG/DL (ref 0–100)
LEUKOCYTE ESTERASE UR QL STRIP.AUTO: NEGATIVE
LIPID PROFILE,FLP: ABNORMAL
LYMPHOCYTES # BLD: 1.6 K/UL (ref 0.8–3.5)
LYMPHOCYTES NFR BLD: 18 % (ref 12–49)
MCH RBC QN AUTO: 30 PG (ref 26–34)
MCHC RBC AUTO-ENTMCNC: 33.8 G/DL (ref 30–36.5)
MCV RBC AUTO: 88.5 FL (ref 80–99)
MONOCYTES # BLD: 0.4 K/UL (ref 0–1)
MONOCYTES NFR BLD: 5 % (ref 5–13)
MUCOUS THREADS URNS QL MICRO: ABNORMAL /LPF
NEUTS SEG # BLD: 6.5 K/UL (ref 1.8–8)
NEUTS SEG NFR BLD: 71 % (ref 32–75)
NITRITE UR QL STRIP.AUTO: NEGATIVE
NRBC # BLD: 0 K/UL (ref 0–0.01)
NRBC BLD-RTO: 0 PER 100 WBC
PH UR STRIP: 5 [PH] (ref 5–8)
PLATELET # BLD AUTO: 192 K/UL (ref 150–400)
PMV BLD AUTO: 10.2 FL (ref 8.9–12.9)
POTASSIUM SERPL-SCNC: 4 MMOL/L (ref 3.5–5.1)
PROT SERPL-MCNC: 7.3 G/DL (ref 6.4–8.2)
PROT UR STRIP-MCNC: NEGATIVE MG/DL
RBC # BLD AUTO: 5.14 M/UL (ref 4.1–5.7)
RBC #/AREA URNS HPF: ABNORMAL /HPF (ref 0–5)
SODIUM SERPL-SCNC: 143 MMOL/L (ref 136–145)
SP GR UR REFRACTOMETRY: 1.03 (ref 1–1.03)
TRIGL SERPL-MCNC: 170 MG/DL (ref ?–150)
TSH SERPL DL<=0.05 MIU/L-ACNC: 2.04 UIU/ML (ref 0.36–3.74)
UROBILINOGEN UR QL STRIP.AUTO: 0.1 EU/DL (ref 0.1–1)
VLDLC SERPL CALC-MCNC: 34 MG/DL
WBC # BLD AUTO: 9.1 K/UL (ref 4.1–11.1)
WBC URNS QL MICRO: ABNORMAL /HPF (ref 0–4)

## 2022-02-28 PROCEDURE — 84443 ASSAY THYROID STIM HORMONE: CPT

## 2022-02-28 PROCEDURE — 36415 COLL VENOUS BLD VENIPUNCTURE: CPT

## 2022-02-28 PROCEDURE — 82570 ASSAY OF URINE CREATININE: CPT

## 2022-02-28 PROCEDURE — 83036 HEMOGLOBIN GLYCOSYLATED A1C: CPT

## 2022-02-28 PROCEDURE — 81001 URINALYSIS AUTO W/SCOPE: CPT

## 2022-02-28 PROCEDURE — 80061 LIPID PANEL: CPT

## 2022-02-28 PROCEDURE — 85025 COMPLETE CBC W/AUTO DIFF WBC: CPT

## 2022-02-28 PROCEDURE — 80053 COMPREHEN METABOLIC PANEL: CPT

## 2022-03-18 PROBLEM — R00.1 BRADYCARDIA: Status: ACTIVE | Noted: 2021-12-16

## 2022-03-19 PROBLEM — R07.9 CHEST PAIN: Status: ACTIVE | Noted: 2021-11-23

## 2022-04-20 ENCOUNTER — APPOINTMENT (OUTPATIENT)
Dept: CT IMAGING | Age: 67
End: 2022-04-20
Attending: HOSPITALIST
Payer: MEDICARE

## 2022-04-20 ENCOUNTER — HOSPITAL ENCOUNTER (OUTPATIENT)
Age: 67
Setting detail: OBSERVATION
Discharge: HOME | End: 2022-04-22
Attending: EMERGENCY MEDICINE | Admitting: HOSPITALIST
Payer: MEDICARE

## 2022-04-20 ENCOUNTER — APPOINTMENT (OUTPATIENT)
Dept: GENERAL RADIOLOGY | Age: 67
End: 2022-04-20
Attending: EMERGENCY MEDICINE
Payer: MEDICARE

## 2022-04-20 DIAGNOSIS — R00.1 BRADYCARDIA: Primary | ICD-10-CM

## 2022-04-20 LAB
ALBUMIN SERPL-MCNC: 4.1 G/DL (ref 3.5–5)
ALBUMIN/GLOB SERPL: 1.3 {RATIO} (ref 1.1–2.2)
ALP SERPL-CCNC: 68 U/L (ref 45–117)
ALT SERPL-CCNC: 27 U/L (ref 12–78)
ANION GAP SERPL CALC-SCNC: 4 MMOL/L (ref 5–15)
AST SERPL W P-5'-P-CCNC: 29 U/L (ref 15–37)
BASOPHILS # BLD: 0.1 K/UL (ref 0–0.1)
BASOPHILS NFR BLD: 1 % (ref 0–1)
BILIRUB SERPL-MCNC: 0.5 MG/DL (ref 0.2–1)
BUN SERPL-MCNC: 21 MG/DL (ref 6–20)
BUN/CREAT SERPL: 14 (ref 12–20)
CA-I BLD-MCNC: 8.6 MG/DL (ref 8.5–10.1)
CHLORIDE SERPL-SCNC: 108 MMOL/L (ref 97–108)
CO2 SERPL-SCNC: 29 MMOL/L (ref 21–32)
CREAT SERPL-MCNC: 1.51 MG/DL (ref 0.7–1.3)
DIFFERENTIAL METHOD BLD: ABNORMAL
EOSINOPHIL # BLD: 0.4 K/UL (ref 0–0.4)
EOSINOPHIL NFR BLD: 4 % (ref 0–7)
ERYTHROCYTE [DISTWIDTH] IN BLOOD BY AUTOMATED COUNT: 13.5 % (ref 11.5–14.5)
GLOBULIN SER CALC-MCNC: 3.1 G/DL (ref 2–4)
GLUCOSE BLD STRIP.AUTO-MCNC: 145 MG/DL (ref 65–117)
GLUCOSE SERPL-MCNC: 118 MG/DL (ref 65–100)
HCT VFR BLD AUTO: 43.4 % (ref 36.6–50.3)
HGB BLD-MCNC: 14.9 G/DL (ref 12.1–17)
IMM GRANULOCYTES # BLD AUTO: 0 K/UL (ref 0–0.04)
IMM GRANULOCYTES NFR BLD AUTO: 0 % (ref 0–0.5)
LYMPHOCYTES # BLD: 1.7 K/UL (ref 0.8–3.5)
LYMPHOCYTES NFR BLD: 18 % (ref 12–49)
MCH RBC QN AUTO: 29.5 PG (ref 26–34)
MCHC RBC AUTO-ENTMCNC: 34.3 G/DL (ref 30–36.5)
MCV RBC AUTO: 85.9 FL (ref 80–99)
MONOCYTES # BLD: 0.4 K/UL (ref 0–1)
MONOCYTES NFR BLD: 4 % (ref 5–13)
NEUTS SEG # BLD: 6.9 K/UL (ref 1.8–8)
NEUTS SEG NFR BLD: 73 % (ref 32–75)
NRBC # BLD: 0 K/UL (ref 0–0.01)
NRBC BLD-RTO: 0 PER 100 WBC
PERFORMED BY, TECHID: ABNORMAL
PLATELET # BLD AUTO: 202 K/UL (ref 150–400)
PMV BLD AUTO: 10.2 FL (ref 8.9–12.9)
POTASSIUM SERPL-SCNC: 3.7 MMOL/L (ref 3.5–5.1)
PROT SERPL-MCNC: 7.2 G/DL (ref 6.4–8.2)
RBC # BLD AUTO: 5.05 M/UL (ref 4.1–5.7)
SODIUM SERPL-SCNC: 141 MMOL/L (ref 136–145)
TROPONIN-HIGH SENSITIVITY: 28 NG/L (ref 0–76)
TROPONIN-HIGH SENSITIVITY: 32 NG/L (ref 0–76)
WBC # BLD AUTO: 9.5 K/UL (ref 4.1–11.1)

## 2022-04-20 PROCEDURE — 72125 CT NECK SPINE W/O DYE: CPT

## 2022-04-20 PROCEDURE — G0378 HOSPITAL OBSERVATION PER HR: HCPCS

## 2022-04-20 PROCEDURE — 74011250636 HC RX REV CODE- 250/636: Performed by: EMERGENCY MEDICINE

## 2022-04-20 PROCEDURE — 93005 ELECTROCARDIOGRAM TRACING: CPT

## 2022-04-20 PROCEDURE — 71045 X-RAY EXAM CHEST 1 VIEW: CPT

## 2022-04-20 PROCEDURE — 85025 COMPLETE CBC W/AUTO DIFF WBC: CPT

## 2022-04-20 PROCEDURE — 36415 COLL VENOUS BLD VENIPUNCTURE: CPT

## 2022-04-20 PROCEDURE — 99285 EMERGENCY DEPT VISIT HI MDM: CPT

## 2022-04-20 PROCEDURE — 80053 COMPREHEN METABOLIC PANEL: CPT

## 2022-04-20 PROCEDURE — 74011000250 HC RX REV CODE- 250: Performed by: HOSPITALIST

## 2022-04-20 PROCEDURE — 74011250637 HC RX REV CODE- 250/637: Performed by: HOSPITALIST

## 2022-04-20 PROCEDURE — 82962 GLUCOSE BLOOD TEST: CPT

## 2022-04-20 PROCEDURE — 74011250637 HC RX REV CODE- 250/637: Performed by: EMERGENCY MEDICINE

## 2022-04-20 PROCEDURE — 84484 ASSAY OF TROPONIN QUANT: CPT

## 2022-04-20 PROCEDURE — 74011250636 HC RX REV CODE- 250/636: Performed by: HOSPITALIST

## 2022-04-20 RX ORDER — ISOSORBIDE MONONITRATE 30 MG/1
30 TABLET, EXTENDED RELEASE ORAL DAILY
Status: DISCONTINUED | OUTPATIENT
Start: 2022-04-21 | End: 2022-04-22 | Stop reason: HOSPADM

## 2022-04-20 RX ORDER — ONDANSETRON 4 MG/1
4 TABLET, ORALLY DISINTEGRATING ORAL
Status: DISCONTINUED | OUTPATIENT
Start: 2022-04-20 | End: 2022-04-21

## 2022-04-20 RX ORDER — CLOPIDOGREL BISULFATE 75 MG/1
75 TABLET ORAL DAILY
Status: DISCONTINUED | OUTPATIENT
Start: 2022-04-21 | End: 2022-04-22 | Stop reason: HOSPADM

## 2022-04-20 RX ORDER — ASPIRIN 81 MG/1
81 TABLET ORAL DAILY
Status: DISCONTINUED | OUTPATIENT
Start: 2022-04-21 | End: 2022-04-22 | Stop reason: HOSPADM

## 2022-04-20 RX ORDER — LISINOPRIL 20 MG/1
40 TABLET ORAL DAILY
Status: DISCONTINUED | OUTPATIENT
Start: 2022-04-21 | End: 2022-04-22 | Stop reason: HOSPADM

## 2022-04-20 RX ORDER — GUAIFENESIN 100 MG/5ML
243 LIQUID (ML) ORAL
Status: COMPLETED | OUTPATIENT
Start: 2022-04-20 | End: 2022-04-20

## 2022-04-20 RX ORDER — METFORMIN HYDROCHLORIDE 500 MG/1
1000 TABLET ORAL 2 TIMES DAILY
Status: DISCONTINUED | OUTPATIENT
Start: 2022-04-20 | End: 2022-04-21

## 2022-04-20 RX ORDER — HYDROCHLOROTHIAZIDE 25 MG/1
12.5 TABLET ORAL DAILY
COMMUNITY
Start: 2022-04-04

## 2022-04-20 RX ORDER — SODIUM CHLORIDE 0.9 % (FLUSH) 0.9 %
5-40 SYRINGE (ML) INJECTION EVERY 8 HOURS
Status: DISCONTINUED | OUTPATIENT
Start: 2022-04-20 | End: 2022-04-22 | Stop reason: HOSPADM

## 2022-04-20 RX ORDER — SODIUM CHLORIDE 0.9 % (FLUSH) 0.9 %
5-40 SYRINGE (ML) INJECTION AS NEEDED
Status: DISCONTINUED | OUTPATIENT
Start: 2022-04-20 | End: 2022-04-22 | Stop reason: HOSPADM

## 2022-04-20 RX ORDER — ATORVASTATIN CALCIUM 40 MG/1
40 TABLET, FILM COATED ORAL EVERY EVENING
Status: DISCONTINUED | OUTPATIENT
Start: 2022-04-20 | End: 2022-04-22 | Stop reason: HOSPADM

## 2022-04-20 RX ORDER — ENOXAPARIN SODIUM 100 MG/ML
40 INJECTION SUBCUTANEOUS EVERY 24 HOURS
Status: DISCONTINUED | OUTPATIENT
Start: 2022-04-20 | End: 2022-04-22 | Stop reason: HOSPADM

## 2022-04-20 RX ORDER — ACETAMINOPHEN 325 MG/1
650 TABLET ORAL
Status: DISCONTINUED | OUTPATIENT
Start: 2022-04-20 | End: 2022-04-22 | Stop reason: HOSPADM

## 2022-04-20 RX ORDER — NITROGLYCERIN 0.4 MG/1
0.4 TABLET SUBLINGUAL
Status: DISCONTINUED | OUTPATIENT
Start: 2022-04-20 | End: 2022-04-22 | Stop reason: HOSPADM

## 2022-04-20 RX ORDER — HYDROCHLOROTHIAZIDE 25 MG/1
25 TABLET ORAL DAILY
Status: DISCONTINUED | OUTPATIENT
Start: 2022-04-21 | End: 2022-04-20

## 2022-04-20 RX ORDER — HYDROCHLOROTHIAZIDE 25 MG/1
12.5 TABLET ORAL DAILY
Status: DISCONTINUED | OUTPATIENT
Start: 2022-04-21 | End: 2022-04-21

## 2022-04-20 RX ADMIN — SODIUM CHLORIDE, PRESERVATIVE FREE 10 ML: 5 INJECTION INTRAVENOUS at 22:04

## 2022-04-20 RX ADMIN — ASPIRIN 81 MG 243 MG: 81 TABLET ORAL at 19:01

## 2022-04-20 RX ADMIN — SODIUM CHLORIDE 1000 ML: 9 INJECTION, SOLUTION INTRAVENOUS at 19:00

## 2022-04-20 RX ADMIN — ATORVASTATIN CALCIUM 40 MG: 40 TABLET, FILM COATED ORAL at 22:03

## 2022-04-20 NOTE — H&P
History and Physical              Subjective :   Chief Complaint : Feeling of pressure in the back, buzzing sound    Source of information : Patient, spouse at bedside. History of present illness:   78 y.o. male history of coronary artery disease with stent placement, diabetes mellitus not on any medications at this time, hypertension presents to the emergency room complaining of not feeling good. States suddenly he started feeling something wrong in his face and neck. Feels like some kind of buzzing feeling and then feels tightness in the throat and neck area. States he cannot explain but it is something all his face was not feeling good. Worse with activity, being very lethargic and feeling fatigue and tired. Denies any fever, chills, nasal congestion or trouble breathing. Wife states previously when he had similar symptoms found with coronary artery disease and stent placement done. So brought to the emergency room. States symptoms are better since he is in the emergency room, and while I was in conversation with him he said he felt again the same symptoms. Wife is also concerned about his heart rate being dropping, as low as 39. States whenever he has symptoms checking his blood pressure and heart rate. On questioning he is admits that he feels dizzy sometimes but denies any syncope. No falls. Also when he is trying to walk feels kind of disturbance in the gait as if his balance not right. He also complains of pain in the neck that is radiating into the shoulders, worse with turning the head.     Past Medical History:   Diagnosis Date    Arthritis     CAD (coronary artery disease)     patient stated he has 2 stents     Diabetes [facility])     Patient stated diet controlled monitors sugar daily not currently on medications    GERD (gastroesophageal reflux disease)     Heart attack [facility])     June 2020    Hyperlipidemia     Hypertension     Ill-defined condition     patient stated approx 2 weeks ago he had a knot in his left leg after wearing tight socks went to his PCP was told it was a blood clot and was told to apply heat he stated it has gotten better stated small knot remains pain had ceased as well as swelling    Sleep apnea     Patient stated he had a CPAP in the past no longer has to use      Past Surgical History:   Procedure Laterality Date    HX COLONOSCOPY      HX ORTHOPAEDIC      pt states he's had 4 back surgeries    OR ABDOMEN SURGERY PROC UNLISTED      hernia repair times 3    OR CARDIAC SURG PROCEDURE UNLIST      cardiac cath 2020     Family History   Problem Relation Age of Onset    Heart Disease Mother     Heart Disease Father       Social History     Tobacco Use    Smoking status: Former Smoker     Packs/day: 0.50    Smokeless tobacco: Never Used   Substance Use Topics    Alcohol use: Not Currently     Comment: Used to only drink on special occassions       Prior to Admission medications    Medication Sig Start Date End Date Taking? Authorizing Provider   hydroCHLOROthiazide (HYDRODIURIL) 25 mg tablet Take 25 mg by mouth daily. 4/4/22   Provider, Historical   lisinopriL (PRINIVIL, ZESTRIL) 40 mg tablet Take 1 Tablet by mouth daily. 12/17/21   Sharath Lora MD   metFORMIN (GLUCOPHAGE) 500 mg tablet Take 1,000 mg by mouth two (2) times a day. 10/7/21   Provider, Historical   atorvastatin (LIPITOR) 40 mg tablet Take 1 Tablet by mouth every evening. 12/2/21   Provider, Historical   clopidogreL (Plavix) 75 mg tab Take 75 mg by mouth daily. Provider, Historical   aspirin delayed-release 81 mg tablet Take 81 mg by mouth daily. Provider, Historical     Allergies   Allergen Reactions    Penicillins Other (comments)     Patient stated as a child had convulsions, nausea and vomiting              Review of Systems:  Constitutional: Appetite is good, denies weight loss, no fever, no chills, no night sweats.   Eye: No recent visual disturbances, no discharge, no double vision. Ear/nose/mouth/throat : No hearing disturbance, no ear pain, no nasal congestion, no sore throat, no trouble swallowing. Respiratory : No trouble breathing, no cough, no shortness of breath, no hemoptysis, no wheezing. Cardiovascular : no palpitation,  no orthopnea, , no peripheral edema. Gastrointestinal : No nausea, no vomiting,  No abdominal pain. Genitourinary : No dysuria, no hematuria, no increased frequency, No incontinence. Lymphatics : No swollen glands -Neck, axillary, inguinal.  Endocrine : No excessive thirst, No polyuria No cold intolerance, No heat intolerance. Immunologic : No hives, urticaria, No seasonal allergies. Musculoskeletal : No joint swelling, No pain, No effusion,    Integumentary : No rash, No pruritus, No ecchymosis. Hematology : No petechiae, No easy bruising,  No tendency to bleed easy. Neurology : Denies change in mental status, No confusion, No numbness or tingling. Psychiatric : No mood swings, No anxiety, No depression. Vitals:     Patient Vitals for the past 12 hrs:   Temp Pulse Resp BP SpO2   04/20/22 1539 98.4 °F (36.9 °C) (!) 51 16 (!) 180/81 99 %       Physical Exam:   General : Looks tired, no distress. Little anxious. HEENT : PERRLA, normal oral mucosa, atraumatic normocephalic, Normal ear and nose. Neck : Supple, no JVD, no masses noted, no carotid bruit. Lungs : Breath sounds with good air entry bilaterally, no wheezes or rales, no accessory muscle use. CVS : Rhythm rate regular, S1+, S2+, no murmur or gallop. Monitor with fluctuating heart rate around upper 50, sometimes dropping below. Abdomen : Soft, nontender,  bowel sounds active. Extremities : No edema noted,  pedal pulses not palpable. Musculoskeletal : Fair range of motion, no joint swelling or effusion, muscle tone and power appears fair. Skin : Moist, warm, no pathological rash. Lymphatic : No cervical lymphadenopathy. Neurological : Awake, alert, oriented to time place person. Psychiatric : Mood and affect appears anxious. Data Review:   Recent Results (from the past 24 hour(s))   CBC WITH AUTOMATED DIFF    Collection Time: 04/20/22  4:24 PM   Result Value Ref Range    WBC 9.5 4.1 - 11.1 K/uL    RBC 5.05 4. 10 - 5.70 M/uL    HGB 14.9 12.1 - 17.0 g/dL    HCT 43.4 36.6 - 50.3 %    MCV 85.9 80.0 - 99.0 FL    MCH 29.5 26.0 - 34.0 PG    MCHC 34.3 30.0 - 36.5 g/dL    RDW 13.5 11.5 - 14.5 %    PLATELET 387 595 - 367 K/uL    MPV 10.2 8.9 - 12.9 FL    NRBC 0.0 0.0  WBC    ABSOLUTE NRBC 0.00 0.00 - 0.01 K/uL    NEUTROPHILS 73 32 - 75 %    LYMPHOCYTES 18 12 - 49 %    MONOCYTES 4 (L) 5 - 13 %    EOSINOPHILS 4 0 - 7 %    BASOPHILS 1 0 - 1 %    IMMATURE GRANULOCYTES 0 0 - 0.5 %    ABS. NEUTROPHILS 6.9 1.8 - 8.0 K/UL    ABS. LYMPHOCYTES 1.7 0.8 - 3.5 K/UL    ABS. MONOCYTES 0.4 0.0 - 1.0 K/UL    ABS. EOSINOPHILS 0.4 0.0 - 0.4 K/UL    ABS. BASOPHILS 0.1 0.0 - 0.1 K/UL    ABS. IMM. GRANS. 0.0 0.00 - 0.04 K/UL    DF AUTOMATED     METABOLIC PANEL, COMPREHENSIVE    Collection Time: 04/20/22  4:24 PM   Result Value Ref Range    Sodium 141 136 - 145 mmol/L    Potassium 3.7 3.5 - 5.1 mmol/L    Chloride 108 97 - 108 mmol/L    CO2 29 21 - 32 mmol/L    Anion gap 4 (L) 5 - 15 mmol/L    Glucose 118 (H) 65 - 100 mg/dL    BUN 21 (H) 6 - 20 mg/dL    Creatinine 1.51 (H) 0.70 - 1.30 mg/dL    BUN/Creatinine ratio 14 12 - 20      GFR est AA 56 (L) >60 ml/min/1.73m2    GFR est non-AA 46 (L) >60 ml/min/1.73m2    Calcium 8.6 8.5 - 10.1 mg/dL    Bilirubin, total 0.5 0.2 - 1.0 mg/dL    AST (SGOT) 29 15 - 37 U/L    ALT (SGPT) 27 12 - 78 U/L    Alk.  phosphatase 68 45 - 117 U/L    Protein, total 7.2 6.4 - 8.2 g/dL    Albumin 4.1 3.5 - 5.0 g/dL    Globulin 3.1 2.0 - 4.0 g/dL    A-G Ratio 1.3 1.1 - 2.2     TROPONIN-HIGH SENSITIVITY    Collection Time: 04/20/22  4:24 PM   Result Value Ref Range    Troponin-High Sensitivity 28 0 - 76 ng/L   TROPONIN-HIGH SENSITIVITY    Collection Time: 04/20/22  6:15 PM   Result Value Ref Range    Troponin-High Sensitivity 32 0 - 76 ng/L       Radiologic Studies :     CXR Results  (Last 48 hours)               04/20/22 1648  XR CHEST PORT Final result    Impression:  No acute findings. Narrative:  Chest pain. Comparison chest x-ray 12/16/2021. Findings: Single frontal view of the chest. Normal cardiomediastinal silhouette. No vascular congestion or pulmonary edema. The lungs are well-inflated. No   infiltrate, effusion, pneumothorax. No free air under the hemidiaphragms. Degenerative changes bony structures. Assessment and Plan :     Chest pain equivalent: The symptoms they are concerned as he had the same kind of symptoms when he had a heart attack. Already being followed by the cardiology, contacted by the emergency room physician to the primary cardiologist.  Cardiac enzymes with mild elevation, we will admit for further management. Gait disturbance: Seems that balance seems to be unsteady for patient, episodes of dizziness. Need to make sure there is no inner ear pathology versus posterior fossa neurological abnormalities. I requested for MRI of the head, carotid duplex and will follow up    Cervical spine pain: History of spine issues with low back surgeries. Ordered for cervical spine CT, results are multilevel cervical spondylosis with moderate to severe spinal canal stenosis and bilateral neuroforaminal narrowing. We will consult spine surgery for further evaluation. Benign essential hypertension: Uncontrolled, will decrease hydrochlorothiazide to 12.5 mg, ordered isosorbide 30 mg daily. We will monitor blood pressure closely and adjust.    History of coronary artery disease, on clopidogrel which we will continue    Diabetes mellitus type 2: Was on medications but recently discontinued as his blood sugars are well controlled. Mild dehydration: Already received IV fluids, we will encourage oral fluid intake.     Admitted to cardiac telemetry for observation, full CODE STATUS, home medications reviewed and verified with patient and his spouse and external Rx history. CC : Jose Luis Silva MD  Signed By: Sheldon Flowres MD     April 20, 2022      This dictation was done by dragon, computer voice recognition software. Often unanticipated grammatical, syntax, [place] phones and other interpretive errors are inadvertently transcribed. Please excuse errors that have escaped final proofreading.

## 2022-04-20 NOTE — ED TRIAGE NOTES
Pt arrived c/o chest pressure, and feeling nauseous. Started around 1200 today. States all around his mouth feels \"funny\" like numb.  States 'Something just doesn't feel right\"

## 2022-04-21 ENCOUNTER — APPOINTMENT (OUTPATIENT)
Dept: NON INVASIVE DIAGNOSTICS | Age: 67
End: 2022-04-21
Attending: HOSPITALIST
Payer: MEDICARE

## 2022-04-21 ENCOUNTER — APPOINTMENT (OUTPATIENT)
Dept: MRI IMAGING | Age: 67
End: 2022-04-21
Attending: HOSPITALIST
Payer: MEDICARE

## 2022-04-21 PROBLEM — G45.9 TIA (TRANSIENT ISCHEMIC ATTACK): Status: ACTIVE | Noted: 2022-04-21

## 2022-04-21 PROBLEM — R51.9 HEADACHE: Status: ACTIVE | Noted: 2022-04-21

## 2022-04-21 PROBLEM — R51.9 FACIAL PAIN: Status: ACTIVE | Noted: 2022-04-21

## 2022-04-21 LAB
ANION GAP SERPL CALC-SCNC: 6 MMOL/L (ref 5–15)
ATRIAL RATE: 38 BPM
ATRIAL RATE: 54 BPM
BUN SERPL-MCNC: 22 MG/DL (ref 6–20)
BUN/CREAT SERPL: 16 (ref 12–20)
CA-I BLD-MCNC: 8.3 MG/DL (ref 8.5–10.1)
CALCULATED P AXIS, ECG09: 74 DEGREES
CALCULATED P AXIS, ECG09: 74 DEGREES
CALCULATED R AXIS, ECG10: 37 DEGREES
CALCULATED R AXIS, ECG10: 89 DEGREES
CALCULATED T AXIS, ECG11: 125 DEGREES
CALCULATED T AXIS, ECG11: 31 DEGREES
CHLORIDE SERPL-SCNC: 110 MMOL/L (ref 97–108)
CO2 SERPL-SCNC: 28 MMOL/L (ref 21–32)
CREAT SERPL-MCNC: 1.35 MG/DL (ref 0.7–1.3)
DIAGNOSIS, 93000: NORMAL
DIAGNOSIS, 93000: NORMAL
GLUCOSE BLD STRIP.AUTO-MCNC: 144 MG/DL (ref 65–117)
GLUCOSE SERPL-MCNC: 132 MG/DL (ref 65–100)
LEFT ARM BP: 165 MMHG
LEFT CCA DIST DIAS: 19.1 CM/S
LEFT CCA DIST SYS: 89.4 CM/S
LEFT CCA PROX DIAS: 19.2 CM/S
LEFT CCA PROX SYS: 101 CM/S
LEFT ECA DIAS: 8.6 CM/S
LEFT ECA SYS: 165 CM/S
LEFT ICA DIST DIAS: 25.8 CM/S
LEFT ICA DIST SYS: 121 CM/S
LEFT ICA PROX DIAS: 36.7 CM/S
LEFT ICA PROX SYS: 111 CM/S
P-R INTERVAL, ECG05: 218 MS
P-R INTERVAL, ECG05: 218 MS
PERFORMED BY, TECHID: ABNORMAL
POTASSIUM SERPL-SCNC: 3.9 MMOL/L (ref 3.5–5.1)
Q-T INTERVAL, ECG07: 462 MS
Q-T INTERVAL, ECG07: 494 MS
QRS DURATION, ECG06: 92 MS
QRS DURATION, ECG06: 98 MS
QTC CALCULATION (BEZET), ECG08: 392 MS
QTC CALCULATION (BEZET), ECG08: 438 MS
RIGHT ARM BP: 165 MMHG
RIGHT CCA DIST DIAS: 19.1 CM/S
RIGHT CCA DIST SYS: 121 CM/S
RIGHT CCA PROX DIAS: 11.5 CM/S
RIGHT CCA PROX SYS: 118 CM/S
RIGHT ECA DIAS: 0 CM/S
RIGHT ECA SYS: 124 CM/S
RIGHT ICA DIST DIAS: 18.3 CM/S
RIGHT ICA DIST SYS: 90.9 CM/S
RIGHT ICA PROX DIAS: 19.9 CM/S
RIGHT ICA PROX SYS: 114 CM/S
RIGHT ICA/CCA SYS: 0.94
RIGHT VERTEBRAL DIAS: 2.8 CM/S
RIGHT VERTEBRAL SYS: 62.9 CM/S
SODIUM SERPL-SCNC: 144 MMOL/L (ref 136–145)
TROPONIN-HIGH SENSITIVITY: 39 NG/L (ref 0–76)
VAS LEFT SUBCLAVIAN MID EDV: 4.8 CM/S
VAS LEFT SUBCLAVIAN MID PSV: 182 CM/S
VAS RIGHT SUBCLAVIAN MID EDV: 0 CM/S
VAS RIGHT SUBCLAVIAN MID PSV: 99.3 CM/S
VENTRICULAR RATE, ECG03: 38 BPM
VENTRICULAR RATE, ECG03: 54 BPM

## 2022-04-21 PROCEDURE — 36415 COLL VENOUS BLD VENIPUNCTURE: CPT

## 2022-04-21 PROCEDURE — G0378 HOSPITAL OBSERVATION PER HR: HCPCS

## 2022-04-21 PROCEDURE — 93880 EXTRACRANIAL BILAT STUDY: CPT

## 2022-04-21 PROCEDURE — 74011250637 HC RX REV CODE- 250/637: Performed by: HOSPITALIST

## 2022-04-21 PROCEDURE — 80048 BASIC METABOLIC PNL TOTAL CA: CPT

## 2022-04-21 PROCEDURE — 84484 ASSAY OF TROPONIN QUANT: CPT

## 2022-04-21 PROCEDURE — 96372 THER/PROPH/DIAG INJ SC/IM: CPT

## 2022-04-21 PROCEDURE — 82962 GLUCOSE BLOOD TEST: CPT

## 2022-04-21 PROCEDURE — 74011250636 HC RX REV CODE- 250/636: Performed by: HOSPITALIST

## 2022-04-21 PROCEDURE — 70551 MRI BRAIN STEM W/O DYE: CPT

## 2022-04-21 PROCEDURE — 74011000250 HC RX REV CODE- 250: Performed by: HOSPITALIST

## 2022-04-21 RX ADMIN — ENOXAPARIN SODIUM 40 MG: 40 INJECTION SUBCUTANEOUS at 21:33

## 2022-04-21 RX ADMIN — ASPIRIN 81 MG: 81 TABLET, COATED ORAL at 08:51

## 2022-04-21 RX ADMIN — ATORVASTATIN CALCIUM 40 MG: 40 TABLET, FILM COATED ORAL at 18:13

## 2022-04-21 RX ADMIN — CLOPIDOGREL BISULFATE 75 MG: 75 TABLET ORAL at 08:52

## 2022-04-21 RX ADMIN — SODIUM CHLORIDE, PRESERVATIVE FREE 10 ML: 5 INJECTION INTRAVENOUS at 21:33

## 2022-04-21 RX ADMIN — ISOSORBIDE MONONITRATE 30 MG: 30 TABLET, EXTENDED RELEASE ORAL at 08:51

## 2022-04-21 RX ADMIN — SODIUM CHLORIDE, PRESERVATIVE FREE 10 ML: 5 INJECTION INTRAVENOUS at 05:23

## 2022-04-21 RX ADMIN — LISINOPRIL 40 MG: 20 TABLET ORAL at 08:52

## 2022-04-21 RX ADMIN — SODIUM CHLORIDE, PRESERVATIVE FREE 10 ML: 5 INJECTION INTRAVENOUS at 14:00

## 2022-04-21 RX ADMIN — HYDROCHLOROTHIAZIDE 12.5 MG: 25 TABLET ORAL at 08:51

## 2022-04-21 NOTE — PROGRESS NOTES
Primary Nurse Marco Joseph RN and second RN performed a dual skin assessment on this patient. No open areas noted. Skin is dry, clean, and intact.

## 2022-04-21 NOTE — PROGRESS NOTES
Problem: Falls - Risk of  Goal: *Absence of Falls  Description: Document Morene Hole Fall Risk and appropriate interventions in the flowsheet.   Outcome: Progressing Towards Goal  Note: Fall Risk Interventions:     Medication Interventions: Bed/chair exit alarm,Patient to call before getting OOB,Teach patient to arise slowly    Problem: Patient Education: Go to Patient Education Activity  Goal: Patient/Family Education  Outcome: Progressing Towards Goal

## 2022-04-21 NOTE — ED PROVIDER NOTES
EMERGENCY DEPARTMENT HISTORY AND PHYSICAL EXAM      Date: 4/20/2022  Patient Name: Suzy Mcclure      History of Presenting Illness     Chief Complaint   Patient presents with    Chest Pain    Epigastric Pain       History Provided By: Patient    HPI: Suzy Mcculre, 78 y.o. male with a past medical history significant diabetes, hypertension, hyperlipidemia and myocardial infarction presents to the ED with cc of chest and neck pressure ongoing intermittently over the course of the last 3 to 4 days. Patient states that over the last 3 to 4 days, the pain would resolve, however today it did not. He states that his current symptoms feel similar to the symptoms he experienced with his previous MI. He follows with Dr. Sky Zambrano. There are no other complaints, changes, or physical findings at this time.     PCP: Washington Torres MD    Current Facility-Administered Medications   Medication Dose Route Frequency Provider Last Rate Last Admin    [START ON 4/21/2022] clopidogreL (PLAVIX) tablet 75 mg  75 mg Oral DAILY Michael Ellis MD       Michael Hemach Trixie Bottoms ON 4/21/2022] aspirin delayed-release tablet 81 mg  81 mg Oral DAILY Michael Ellis MD        metFORMIN (GLUCOPHAGE) tablet 1,000 mg  1,000 mg Oral BID Michael Ellis MD        atorvastatin (LIPITOR) tablet 40 mg  40 mg Oral QPM Michael Ellis MD   40 mg at 04/20/22 2203    [START ON 4/21/2022] lisinopriL (PRINIVIL, ZESTRIL) tablet 40 mg  40 mg Oral DAILY Michael Ellis MD        sodium chloride (NS) flush 5-40 mL  5-40 mL IntraVENous Q8H Michael Ellis MD   10 mL at 04/20/22 2204    sodium chloride (NS) flush 5-40 mL  5-40 mL IntraVENous PRN Michael Ellis MD        nitroglycerin (NITROSTAT) tablet 0.4 mg  0.4 mg SubLINGual Q5MIN PRN Michael Ellis MD        acetaminophen (TYLENOL) tablet 650 mg  650 mg Oral Q6H PRN Michael Ellis MD        ondansetron (ZOFRAN ODT) tablet 4 mg  4 mg Oral Q6H PRN Janet Fisher MD        enoxaparin (LOVENOX) injection 40 mg  40 mg SubCUTAneous Q24H MD Ankita Mcnally [START ON 4/21/2022] hydroCHLOROthiazide (HYDRODIURIL) tablet 12.5 mg  12.5 mg Oral DAILY MD Ankita Mcnally Adelene Blow ON 4/21/2022] isosorbide mononitrate ER (IMDUR) tablet 30 mg  30 mg Oral DAILY Janet Fisher MD           Past History     Past Medical History:  Past Medical History:   Diagnosis Date    Arthritis     CAD (coronary artery disease)     patient stated he has 2 stents     Diabetes [facility])     Patient stated diet controlled monitors sugar daily not currently on medications    GERD (gastroesophageal reflux disease)     Heart attack [facility])     June 2020    Hyperlipidemia     Hypertension     Ill-defined condition     patient stated approx 2 weeks ago he had a knot in his left leg after wearing tight socks went to his PCP was told it was a blood clot and was told to apply heat he stated it has gotten better stated small knot remains pain had ceased as well as swelling    Sleep apnea     Patient stated he had a CPAP in the past no longer has to use        Past Surgical History:  Past Surgical History:   Procedure Laterality Date    HX COLONOSCOPY      HX ORTHOPAEDIC      pt states he's had 4 back surgeries    NM ABDOMEN SURGERY PROC UNLISTED      hernia repair times 3    NM CARDIAC SURG PROCEDURE UNLIST      cardiac cath 2020       Family History:  Family History   Problem Relation Age of Onset    Heart Disease Mother     Heart Disease Father        Social History:  Social History     Tobacco Use    Smoking status: Former Smoker     Packs/day: 0.50    Smokeless tobacco: Never Used   Vaping Use    Vaping Use: Never used   Substance Use Topics    Alcohol use: Not Currently     Comment: Used to only drink on special occassions    Drug use: Never       Allergies:   Allergies   Allergen Reactions    Penicillins Other (comments) Patient stated as a child had convulsions, nausea and vomiting          Review of Systems     Review of Systems   Constitutional: Negative for chills and fever. HENT: Negative for congestion and rhinorrhea. Eyes: Negative for photophobia and visual disturbance. Respiratory: Negative for cough and shortness of breath. Cardiovascular: Positive for chest pain. Negative for palpitations. Gastrointestinal: Negative for abdominal pain, diarrhea, nausea and vomiting. Genitourinary: Negative for difficulty urinating and dysuria. Musculoskeletal: Negative for arthralgias and myalgias. Skin: Negative for color change and rash. Neurological: Negative for weakness and headaches. Psychiatric/Behavioral: Negative for dysphoric mood and sleep disturbance. Physical Exam     Physical Exam  Constitutional:       General: He is not in acute distress. Appearance: Normal appearance. He is not ill-appearing. HENT:      Head: Normocephalic and atraumatic. Right Ear: External ear normal.      Left Ear: External ear normal.      Nose: Nose normal.      Mouth/Throat:      Mouth: Mucous membranes are moist.   Eyes:      Extraocular Movements: Extraocular movements intact. Conjunctiva/sclera: Conjunctivae normal.      Pupils: Pupils are equal, round, and reactive to light. Cardiovascular:      Rate and Rhythm: Regular rhythm. Bradycardia present. Pulses: Normal pulses. Pulmonary:      Effort: Pulmonary effort is normal. No respiratory distress. Breath sounds: Normal breath sounds. Abdominal:      General: Abdomen is flat. There is no distension. Musculoskeletal:         General: Normal range of motion. Cervical back: Normal range of motion. Skin:     General: Skin is warm and dry. Neurological:      General: No focal deficit present. Mental Status: He is alert and oriented to person, place, and time. Psychiatric:         Mood and Affect: Mood is anxious. Behavior: Behavior normal.         Thought Content: Thought content normal.         Judgment: Judgment normal.         Lab and Diagnostic Study Results     Labs -     Recent Results (from the past 12 hour(s))   CBC WITH AUTOMATED DIFF    Collection Time: 04/20/22  4:24 PM   Result Value Ref Range    WBC 9.5 4.1 - 11.1 K/uL    RBC 5.05 4. 10 - 5.70 M/uL    HGB 14.9 12.1 - 17.0 g/dL    HCT 43.4 36.6 - 50.3 %    MCV 85.9 80.0 - 99.0 FL    MCH 29.5 26.0 - 34.0 PG    MCHC 34.3 30.0 - 36.5 g/dL    RDW 13.5 11.5 - 14.5 %    PLATELET 542 774 - 681 K/uL    MPV 10.2 8.9 - 12.9 FL    NRBC 0.0 0.0  WBC    ABSOLUTE NRBC 0.00 0.00 - 0.01 K/uL    NEUTROPHILS 73 32 - 75 %    LYMPHOCYTES 18 12 - 49 %    MONOCYTES 4 (L) 5 - 13 %    EOSINOPHILS 4 0 - 7 %    BASOPHILS 1 0 - 1 %    IMMATURE GRANULOCYTES 0 0 - 0.5 %    ABS. NEUTROPHILS 6.9 1.8 - 8.0 K/UL    ABS. LYMPHOCYTES 1.7 0.8 - 3.5 K/UL    ABS. MONOCYTES 0.4 0.0 - 1.0 K/UL    ABS. EOSINOPHILS 0.4 0.0 - 0.4 K/UL    ABS. BASOPHILS 0.1 0.0 - 0.1 K/UL    ABS. IMM. GRANS. 0.0 0.00 - 0.04 K/UL    DF AUTOMATED     METABOLIC PANEL, COMPREHENSIVE    Collection Time: 04/20/22  4:24 PM   Result Value Ref Range    Sodium 141 136 - 145 mmol/L    Potassium 3.7 3.5 - 5.1 mmol/L    Chloride 108 97 - 108 mmol/L    CO2 29 21 - 32 mmol/L    Anion gap 4 (L) 5 - 15 mmol/L    Glucose 118 (H) 65 - 100 mg/dL    BUN 21 (H) 6 - 20 mg/dL    Creatinine 1.51 (H) 0.70 - 1.30 mg/dL    BUN/Creatinine ratio 14 12 - 20      GFR est AA 56 (L) >60 ml/min/1.73m2    GFR est non-AA 46 (L) >60 ml/min/1.73m2    Calcium 8.6 8.5 - 10.1 mg/dL    Bilirubin, total 0.5 0.2 - 1.0 mg/dL    AST (SGOT) 29 15 - 37 U/L    ALT (SGPT) 27 12 - 78 U/L    Alk.  phosphatase 68 45 - 117 U/L    Protein, total 7.2 6.4 - 8.2 g/dL    Albumin 4.1 3.5 - 5.0 g/dL    Globulin 3.1 2.0 - 4.0 g/dL    A-G Ratio 1.3 1.1 - 2.2     TROPONIN-HIGH SENSITIVITY    Collection Time: 04/20/22  4:24 PM   Result Value Ref Range    Troponin-High Sensitivity 28 0 - 76 ng/L   TROPONIN-HIGH SENSITIVITY    Collection Time: 04/20/22  6:15 PM   Result Value Ref Range    Troponin-High Sensitivity 32 0 - 76 ng/L   GLUCOSE, POC    Collection Time: 04/20/22 10:06 PM   Result Value Ref Range    Glucose (POC) 145 (H) 65 - 117 mg/dL    Performed by Adriana Corbin        Radiologic Studies -   [unfilled]  CT Results  (Last 48 hours)               04/20/22 2048  CT SPINE CERV WO CONT Final result    Impression:  1. No acute osseous abnormality of the cervical spine. 2.  Advanced multilevel cervical spondylosis including moderate to severe spinal   canal stenosis and severe bilateral neuroforaminal narrowing at C5-C7. Narrative:  Study: Cervical spine CT without contrast.       Clinical Indication: Neck pain. Comparison: Neck CTA dated 10/24/2018. Technique: Routine volume acquisition of the cervical spine was performed   without contrast. Coronal and sagittal reconstructions were generated and   reviewed. Dose reduction: All CT scans at this facility are performed using dose   reduction optimization techniques as appropriate to a performed exam including   the following-automated exposure control, adjustments of mA and/or Kv according   to patient size, or use of iterative reconstructive technique. Findings:       Straightening of the cervical lordosis. C3 inferior endplate Schmorl's node,   otherwise vertebral body heights are preserved. No evidence of an acute   fracture. Intervertebral disc height loss throughout the cervical spine. Coarse multilevel   anterior osteophytes. Multilevel broad-based disc osteophyte complexes and facet   and uncovertebral hypertrophy. At least moderate spinal canal stenosis at C3-C4,   mild spinal canal stenosis at C4-C5, and moderate to severe spinal canal   stenosis at C5-C7. Varying degrees of neuroforaminal narrowing throughout the   cervical spine including severe bilateral neuroforaminal narrowing at C5-C7.        The paraspinal soft tissues are unremarkable. The included lung apices are   clear. CXR Results  (Last 48 hours)               04/20/22 1648  XR CHEST PORT Final result    Impression:  No acute findings. Narrative:  Chest pain. Comparison chest x-ray 12/16/2021. Findings: Single frontal view of the chest. Normal cardiomediastinal silhouette. No vascular congestion or pulmonary edema. The lungs are well-inflated. No   infiltrate, effusion, pneumothorax. No free air under the hemidiaphragms. Degenerative changes bony structures. Medical Decision Making and ED Course   - I am the first and primary provider for this patient AND AM THE PRIMARY PROVIDER OF RECORD. - I reviewed the vital signs, available nursing notes, past medical history, past surgical history, family history and social history. - Initial assessment performed. The patients presenting problems have been discussed, and the staff are in agreement with the care plan formulated and outlined with them. I have encouraged them to ask questions as they arise throughout their visit. Vital Signs-Reviewed the patient's vital signs. Patient Vitals for the past 12 hrs:   Temp Pulse Resp BP SpO2   04/20/22 2158 98.2 °F (36.8 °C) 60 16 (!) 175/74 99 %   04/20/22 1539 98.4 °F (36.9 °C) (!) 51 16 (!) 180/81 99 %       EKG interpretation: (Preliminary): Performed at 1539  Sinus bradycardia with a ventricular to 54, , QRS 92, QTc 438 without evidence of ST depression or elevation. Q waves in leads V1 through V3. Records Reviewed: Nursing Notes    The patient presents with chest pain with a differential diagnosis of  abnormal EKG, ACS, arrhythmia and angina    ED Course:              Provider Notes (Medical Decision Making):   14-year-old male with past medical history significant for CAD presenting to the ED for evaluation of chest and neck pain.   Patient states that symptoms feel similar to his previous MIs. Laboratory work-up without significant abnormality, however patient noted to become bradycardic with heart rates into the mid 30s. Will admit for cardiac observation. [facility]           Consultations:       Consultations: -  Hospitalist Consultant: Dr. Rai Exon: We have asked for emergent assistance with regard to this patient. We have discussed the patients HPI, ROS, PE and results this far. They will come and evaluate the patient for admission. Procedures and Critical Care       Performed by: Lacey Shook DO  PROCEDURES:  Procedures       Disposition     Disposition: Admitted to Observation Unit the case was discussed with the admitting physician     Admitted      Diagnosis     Clinical Impression:   1. Bradycardia        Attestations:    Lacey Shook DO    Please note that this dictation was completed with [email or web address], the computer voice recognition software. Quite often unanticipated grammatical, syntax, homophones, and other interpretive errors are inadvertently transcribed by the computer software. Please disregard these errors. Please excuse any errors that have escaped final proofreading. Thank you.

## 2022-04-21 NOTE — CONSULTS
ORTHOPEDIC CONSULT    Patient: Sandra Vee MRN: [id number]  SSN: [id number]    YOB: 1955  Age: 78 y.o. Sex: male      Subjective:      Sandra Vee is a 78 y.o. male who is being seen in orthopedic consultation for degenerative disc disease of the cervical spine. The patient was admitted to the hospital for what he describes as a tingling sensation of his face. Those symptoms have subsided now. He has been evaluated by cardiology. The patient states he does have a long history of arthritis of his cervical and lumbar spine. The patient states he has had several lumbar spine surgeries. States he frequently goes to a chiropractor for over the last 30 years. He states recently he did have treatment on his neck because of the tightness of his muscles. He complains of minimal discomfort of her cervical spine at this time. He does complain of right shoulder pain which he has had intermittently for several months now. He was told that he does have tendinitis of his right shoulder. He denies any numbness or tingling of his upper extremities. He denies any weakness of his upper extremities. He denies any recent falls. He denies any other musculoskeletal complaints at this time.     Past Medical History:   Diagnosis Date    Arthritis     CAD (coronary artery disease)     patient stated he has 2 stents     Diabetes [facility])     Patient stated diet controlled monitors sugar daily not currently on medications    GERD (gastroesophageal reflux disease)     Heart attack [facility])     June 2020    Hyperlipidemia     Hypertension     Ill-defined condition     patient stated approx 2 weeks ago he had a knot in his left leg after wearing tight socks went to his PCP was told it was a blood clot and was told to apply heat he stated it has gotten better stated small knot remains pain had ceased as well as swelling    Sleep apnea     Patient stated he had a CPAP in the past no longer has to use Past Surgical History:   Procedure Laterality Date    HX COLONOSCOPY      HX ORTHOPAEDIC      pt states he's had 4 back surgeries    [place] ABDOMEN SUR[place]ED      hernia repair times 3    [place] CARDIAC SUR[place]EDURE [place]      cardiac cath 2020      Family History   Problem Relation Age of Onset    Heart Disease Mother     Heart Disease Father      Social History     Tobacco Use    Smoking status: Former Smoker     Packs/day: 0.50    Smokeless tobacco: Never Used   Substance Use Topics    Alcohol use: Not Currently     Comment: Used to only drink on special occassions      Prior to Admission medications    Medication Sig Start Date End Date Taking? Authorizing Provider   hydroCHLOROthiazide (HYDRODIURIL) 25 mg tablet Take 25 mg by mouth daily. 4/4/22   Provider, Historical   lisinopriL (PRINIVIL, ZESTRIL) 40 mg tablet Take 1 Tablet by mouth daily. 12/17/21   Yessi Jasso MD   atorvastatin (LIPITOR) 40 mg tablet Take 1 Tablet by mouth every evening. 12/2/21   Provider, Historical   clopidogreL (Plavix) 75 mg tab Take 75 mg by mouth daily. Provider, Historical   aspirin delayed-release 81 mg tablet Take 81 mg by mouth daily. Provider, Historical       Allergies   Allergen Reactions    Penicillins Other (comments)     Patient stated as a child had convulsions, nausea and vomiting        Review of Systems:  Review of Systems   Constitutional: Negative. HENT: Negative. Eyes: Negative. Respiratory: Negative. Cardiovascular: Negative. Gastrointestinal: Negative. Genitourinary: Negative. Musculoskeletal: Negative. Skin: Negative. Neurological: Negative. Endo/Heme/Allergies: Negative. Psychiatric/Behavioral: Negative.           Objective:     Current Facility-Administered Medications   Medication Dose Route Frequency    clopidogreL (PLAVIX) tablet 75 mg  75 mg Oral DAILY    aspirin delayed-release tablet 81 mg  81 mg Oral DAILY    atorvastatin (LIPITOR) tablet 40 mg  40 mg Oral QPM    lisinopriL (PRINIVIL, ZESTRIL) tablet 40 mg  40 mg Oral DAILY    sodium chloride (NS) flush 5-40 mL  5-40 mL IntraVENous Q8H    sodium chloride (NS) flush 5-40 mL  5-40 mL IntraVENous PRN    nitroglycerin (NITROSTAT) tablet 0.4 mg  0.4 mg SubLINGual Q5MIN PRN    acetaminophen (TYLENOL) tablet 650 mg  650 mg Oral Q6H PRN    enoxaparin (LOVENOX) injection 40 mg  40 mg SubCUTAneous Q24H    isosorbide mononitrate ER (IMDUR) tablet 30 mg  30 mg Oral DAILY      Vitals:    04/21/22 0000 04/21/22 0203 04/21/22 0400 04/21/22 0713   BP:  (!) 140/80  124/63   Pulse: 60 60 (!) 52 (!) 50   Resp:  16  18   Temp:  98 °F (36.7 °C)  97.7 °F (36.5 °C)   SpO2:  98%  97%   Weight:       Height:            Alert and oriented x3, No apparent distress    Physical Exam:  Cervical spine: There is full range of motion of the cervical spine without tenderness. No tenderness palpation vertebral eyes or paravertebral muscles. Upper extremities: There is full range of motion of his bilateral upper extremities without discomfort. Strength of bilateral upper extremities 5 out of 5. Full range of motion right shoulder without tenderness.  strength 5 out of 5. Radial pulses palpable. EPL intact. Cap refill is 2 seconds. DTRs equal throughout bilateral upper extremities. Bilateral upper extremities neurovascularly intact. Labs:  CBC:  Recent Labs     04/20/22  1624   WBC 9.5   RBC 5.05   HGB 14.9   HCT 43.4   MCV 85.9   RDW 13.5        CHEMISTRIES:  Recent Labs     04/21/22  0837 04/20/22  1624    141   K 3.9 3.7   * 108   CO2 28 29   BUN 22* 21*   CREA 1.35* 1.51*   CA 8.3* 8.6   PT/INR:No results for input(s): INR, INREXT in the last 72 hours. No lab exists for component: PROTIME  APTT:No results for input(s): APTT in the last 72 hours.   LIVER PROFILE:  Recent Labs     04/20/22  1624   AST 29   ALT 27       IMAGING:  CT scan taken of her cervical spine yesterday shows multilevel cervical spondylosis with severe canal stenosis and bilateral neuroforaminal stenosis at C5 C7. No acute fracture seen. MRI taken of his brain today shows no acute hemorrhage or infarction. There are findings suggestive of vertebrobasilar insufficiency. Assessment/Plan:     Hospital Problems  Date Reviewed: 4/20/2022          Codes Class Noted POA    Headache ICD-10-CM: R51.9  ICD-9-CM: 784.0  4/21/2022 Unknown        * (Principal) TIA (transient ischemic attack) ICD-10-CM: G45.9  ICD-9-CM: 435.9  4/21/2022 Unknown        Chest pain ICD-10-CM: R07.9  ICD-9-CM: 786.50  11/23/2021 Yes            Degenerative disease/spondylosis with C5-C7 canal and foraminal stenosis. No acute orthopedic surgical intervention needed at this time. Symptoms of his facial tingling do not appear to be from his cervical spine. Patient can follow-up with his chiropractor as needed. Orthopedics will sign off at this time. This patient was examined in direct consultation with Dr. Amberly Myles. Thank you for the courtesy of this consult.     Signed By: Chetan Swan PA-C     April 21, 2022

## 2022-04-21 NOTE — PROGRESS NOTES
Medicare Outpatient Observation Notice (MOON)/ [place] Outpatient Observation Notice (Sissy Damico) provided to patient/representative with verbal explanation of the notice. Time allotted for questions regarding the notice. Patient /representative provided a completed copy of the MOON/VOON notice. Copy placed on bedside chart.

## 2022-04-21 NOTE — ED NOTES
TRANSFER - OUT REPORT:    Verbal report given to joel(name) on Tora Osler  being transferred to [facility](unit) for routine progression of care       Report consisted of patients Situation, Background, Assessment and   Recommendations(SBAR). Information from the following report(s) SBAR, ED Summary, [facility] and Recent Results was reviewed with the receiving nurse. Lines:   Peripheral IV 04/20/22 Right Arm (Active)   Site Assessment Clean, dry, & intact 04/20/22 1602   Phlebitis Assessment 0 04/20/22 1602   Infiltration Assessment 0 04/20/22 1602   Dressing Status Clean, dry, & intact 04/20/22 1602   Dressing Type Tape;Transparent 04/20/22 1602   Hub Color/Line Status Pink;Flushed 04/20/22 1602   Action Taken Blood drawn 04/20/22 1602   Alcohol Cap Used Yes 04/20/22 1602        Opportunity for questions and clarification was provided.       Patient transported with:   Registered Nurse Tele box

## 2022-04-21 NOTE — PROGRESS NOTES
Problem: Falls - Risk of  Goal: *Absence of Falls  Description: Document Mariangel Catalan Fall Risk and appropriate interventions in the flowsheet.   Outcome: Progressing Towards Goal  Note: Fall Risk Interventions:            Medication Interventions: Bed/chair exit alarm                   Problem: Patient Education: Go to Patient Education Activity  Goal: Patient/Family Education  Outcome: Progressing Towards Goal

## 2022-04-21 NOTE — PROGRESS NOTES
Hospitalist Progress Note         VIRI Lam, FNP-C    Daily Progress Note: 4/21/2022      Subjective:   Subjective   Patient examined alert and oriented sitting in bed  Reports continued facial tingling on and off  No acute distress noted on examination    Review of Systems:   Review of Systems   Constitutional: Negative for chills and fever. Respiratory: Negative for cough. Cardiovascular: Negative for chest pain. Gastrointestinal: Negative for heartburn. Genitourinary: Negative for dysuria. Musculoskeletal: Negative for myalgias. Neurological: Positive for tingling. Objective:   Objective      Vitals:  Patient Vitals for the past 12 hrs:   Temp Pulse Resp BP SpO2   04/21/22 0713 97.7 °F (36.5 °C) (!) 50 18 124/63 97 %   04/21/22 0400  (!) 52      04/21/22 0203 98 °F (36.7 °C) 60 16 (!) 140/80 98 %   04/21/22 0000  61           Physical Exam:  Physical Exam  Vitals and nursing note reviewed. Constitutional:       Appearance: Normal appearance. Eyes:      Extraocular Movements: Extraocular movements intact. Cardiovascular:      Rate and Rhythm: Bradycardia present. Heart sounds: Normal heart sounds. Pulmonary:      Breath sounds: Normal breath sounds. Abdominal:      General: Bowel sounds are normal.      Palpations: Abdomen is soft. Musculoskeletal:         General: Normal range of motion. Skin:     General: Skin is warm and dry. Capillary Refill: Capillary refill takes less than 2 seconds. Neurological:      Mental Status: He is alert and oriented to person, place, and time.           Lab Results:  Recent Results (from the past 24 hour(s))   EKG, 12 LEAD, SUBSEQUENT    Collection Time: 04/20/22  3:39 PM   Result Value Ref Range    Ventricular Rate 54 BPM    Atrial Rate 54 BPM    P-R Interval 218 ms    QRS Duration 92 ms    Q-T Interval 462 ms    QTC Calculation (Bezet) 438 ms    Calculated P Axis 74 degrees    Calculated R Axis 89 degrees Calculated T Axis 31 degrees    Diagnosis       Sinus bradycardia with 1st degree A-V block  Septal infarct (cited on or before 16-DEC-2021)  ST & T wave abnormality, consider lateral ischemia  Abnormal ECG  When compared with ECG of 16-DEC-2021 12:52,  QRS axis Shifted right  ST no longer depressed in Inferior leads  Confirmed by NATALIE BRYANT, Jaxon Torres ([id number]) on 4/21/2022 10:32:55 AM     CBC WITH AUTOMATED DIFF    Collection Time: 04/20/22  4:24 PM   Result Value Ref Range    WBC 9.5 4.1 - 11.1 K/uL    RBC 5.05 4. 10 - 5.70 M/uL    HGB 14.9 12.1 - 17.0 g/dL    HCT 43.4 36.6 - 50.3 %    MCV 85.9 80.0 - 99.0 FL    MCH 29.5 26.0 - 34.0 PG    MCHC 34.3 30.0 - 36.5 g/dL    RDW 13.5 11.5 - 14.5 %    PLATELET 042 671 - 405 K/uL    MPV 10.2 8.9 - 12.9 FL    NRBC 0.0 0.0  WBC    ABSOLUTE NRBC 0.00 0.00 - 0.01 K/uL    NEUTROPHILS 73 32 - 75 %    LYMPHOCYTES 18 12 - 49 %    MONOCYTES 4 (L) 5 - 13 %    EOSINOPHILS 4 0 - 7 %    BASOPHILS 1 0 - 1 %    IMMATURE GRANULOCYTES 0 0 - 0.5 %    ABS. NEUTROPHILS 6.9 1.8 - 8.0 K/UL    ABS. LYMPHOCYTES 1.7 0.8 - 3.5 K/UL    ABS. MONOCYTES 0.4 0.0 - 1.0 K/UL    ABS. EOSINOPHILS 0.4 0.0 - 0.4 K/UL    ABS. BASOPHILS 0.1 0.0 - 0.1 K/UL    ABS. IMM. GRANS. 0.0 0.00 - 0.04 K/UL    DF AUTOMATED     METABOLIC PANEL, COMPREHENSIVE    Collection Time: 04/20/22  4:24 PM   Result Value Ref Range    Sodium 141 136 - 145 mmol/L    Potassium 3.7 3.5 - 5.1 mmol/L    Chloride 108 97 - 108 mmol/L    CO2 29 21 - 32 mmol/L    Anion gap 4 (L) 5 - 15 mmol/L    Glucose 118 (H) 65 - 100 mg/dL    BUN 21 (H) 6 - 20 mg/dL    Creatinine 1.51 (H) 0.70 - 1.30 mg/dL    BUN/Creatinine ratio 14 12 - 20      GFR est AA 56 (L) >60 ml/min/1.73m2    GFR est non-AA 46 (L) >60 ml/min/1.73m2    Calcium 8.6 8.5 - 10.1 mg/dL    Bilirubin, total 0.5 0.2 - 1.0 mg/dL    AST (SGOT) 29 15 - 37 U/L    ALT (SGPT) 27 12 - 78 U/L    Alk.  phosphatase 68 45 - 117 U/L    Protein, total 7.2 6.4 - 8.2 g/dL    Albumin 4.1 3.5 - 5.0 g/dL Globulin 3.1 2.0 - 4.0 g/dL    A-G Ratio 1.3 1.1 - 2.2     TROPONIN-HIGH SENSITIVITY    Collection Time: 04/20/22  4:24 PM   Result Value Ref Range    Troponin-High Sensitivity 28 0 - 76 ng/L   TROPONIN-HIGH SENSITIVITY    Collection Time: 04/20/22  6:15 PM   Result Value Ref Range    Troponin-High Sensitivity 32 0 - 76 ng/L   EKG, 12 LEAD, INITIAL    Collection Time: 04/20/22  6:16 PM   Result Value Ref Range    Ventricular Rate 38 BPM    Atrial Rate 38 BPM    P-R Interval 218 ms    QRS Duration 98 ms    Q-T Interval 494 ms    QTC Calculation (Bezet) 392 ms    Calculated P Axis 74 degrees    Calculated R Axis 37 degrees    Calculated T Axis 125 degrees    Diagnosis       Marked sinus bradycardia with 1st degree A-V block  Septal infarct (cited on or before 16-DEC-2021)  T wave abnormality, consider lateral ischemia  Abnormal ECG  When compared with ECG of 20-APR-2022 15:39, (Unconfirmed)  No significant change was found  Confirmed by NATALIE BRYANT, Calvin Arvizu ([id number]) on 4/21/2022 10:33:28 AM     GLUCOSE, POC    Collection Time: 04/20/22 10:06 PM   Result Value Ref Range    Glucose (POC) 145 (H) 65 - 117 mg/dL    Performed by Angel Luis Calle    TROPONIN-HIGH SENSITIVITY    Collection Time: 04/21/22  4:44 AM   Result Value Ref Range    Troponin-High Sensitivity 39 0 - 76 ng/L   GLUCOSE, POC    Collection Time: 04/21/22  7:36 AM   Result Value Ref Range    Glucose (POC) 144 (H) 65 - 117 mg/dL    Performed by Lisy Rojas    METABOLIC PANEL, BASIC    Collection Time: 04/21/22  8:37 AM   Result Value Ref Range    Sodium 144 136 - 145 mmol/L    Potassium 3.9 3.5 - 5.1 mmol/L    Chloride 110 (H) 97 - 108 mmol/L    CO2 28 21 - 32 mmol/L    Anion gap 6 5 - 15 mmol/L    Glucose 132 (H) 65 - 100 mg/dL    BUN 22 (H) 6 - 20 mg/dL    Creatinine 1.35 (H) 0.70 - 1.30 mg/dL    BUN/Creatinine ratio 16 12 - 20      GFR est AA >60 >60 ml/min/1.73m2    GFR est non-AA 53 (L) >60 ml/min/1.73m2    Calcium 8.3 (L) 8.5 - 10.1 mg/dL          Diagnostic Images:  CT Results  (Last 48 hours)               04/20/22 2048  CT SPINE CERV WO CONT Final result    Impression:  1. No acute osseous abnormality of the cervical spine. 2.  Advanced multilevel cervical spondylosis including moderate to severe spinal   canal stenosis and severe bilateral neuroforaminal narrowing at C5-C7. Narrative:  Study: Cervical spine CT without contrast.       Clinical Indication: Neck pain. Comparison: Neck CTA dated 10/24/2018. Technique: Routine volume acquisition of the cervical spine was performed   without contrast. Coronal and sagittal reconstructions were generated and   reviewed. Dose reduction: All CT scans at this facility are performed using dose   reduction optimization techniques as appropriate to a performed exam including   the following-automated exposure control, adjustments of mA and/or Kv according   to patient size, or use of iterative reconstructive technique. Findings:       Straightening of the cervical lordosis. C3 inferior endplate Schmorl's node,   otherwise vertebral body heights are preserved. No evidence of an acute   fracture. Intervertebral disc height loss throughout the cervical spine. Coarse multilevel   anterior osteophytes. Multilevel broad-based disc osteophyte complexes and facet   and uncovertebral hypertrophy. At least moderate spinal canal stenosis at C3-C4,   mild spinal canal stenosis at C4-C5, and moderate to severe spinal canal   stenosis at C5-C7. Varying degrees of neuroforaminal narrowing throughout the   cervical spine including severe bilateral neuroforaminal narrowing at C5-C7. The paraspinal soft tissues are unremarkable. The included lung apices are   clear.                      Current Medications:    Current Facility-Administered Medications:     clopidogreL (PLAVIX) tablet 75 mg, 75 mg, Oral, DAILY, Nate Kincaid MD, 75 mg at 04/21/22 3582    aspirin delayed-release tablet 81 mg, 81 mg, Oral, DAILY, Gage Rees MD, 81 mg at 04/21/22 0851    atorvastatin (LIPITOR) tablet 40 mg, 40 mg, Oral, QPM, Gage Rees MD, 40 mg at 04/20/22 2203    lisinopriL (PRINIVIL, ZESTRIL) tablet 40 mg, 40 mg, Oral, DAILY, Gage Rees MD, 40 mg at 04/21/22 0852    sodium chloride (NS) flush 5-40 mL, 5-40 mL, IntraVENous, Q8H, Gage Rees MD, 10 mL at 04/21/22 0523    sodium chloride (NS) flush 5-40 mL, 5-40 mL, IntraVENous, PRN, Gage Rees MD    nitroglycerin (NITROSTAT) tablet 0.4 mg, 0.4 mg, SubLINGual, Q5MIN PRN, Gage Rees MD    acetaminophen (TYLENOL) tablet 650 mg, 650 mg, Oral, Q6H PRN, Gage Rees MD    ondansetron (ZOFRAN ODT) tablet 4 mg, 4 mg, Oral, Q6H PRN, Gage Rees MD    enoxaparin (LOVENOX) injection 40 mg, 40 mg, SubCUTAneous, Q24H, Gage Rees MD    isosorbide mononitrate ER (IMDUR) tablet 30 mg, 30 mg, Oral, DAILY, Gage Rees MD, 30 mg at 04/21/22 0851       ASSESSMENT:    1. Suspected TIA  -reports facial tingling and sharp pain  -MRI brain  -carotid duplex  -obtain neurology evaluation    2. Cervical spondylosis  -ct shows advanced multilevel cervical spondylosis including moderate to severe spinal  canal stenosis and severe bilateral neuroforaminal narrowing at C5-C7. -ortho consulted    3. Hypertension w/ bradycardia  -bp currently at goal  -continue imdur 30mg, lisinopril 40mg  -trop x 3 neg  -cardiology consulted  -echo ordered  -discontinue hctz    4. HX CAD  -managed with plavix 75mg, asa 81 mg, lipitor 40mg    5. HX NSTEMI  -underwent heart cath with LAD stent placement March 2021  -managed with imdur 30mg, lisinopril 40mg, plavix 75mg, asa 81 mg, lipitor 40mg    6.  CKD Stage 2  -CR appears at baseline  -limit nephrotoxic medications          Full Code  Dvt Prophylaxis lovenox  GI Prophylaxis none need  Disposition:  -mri brain, carotid duplex  -neuro evaluation  -cardiology/ortho evaluation      Above treatment plan reviewed and discussed with patient in detail at bedside, all questions answered. Care Plan discussed with: Interdisciplinary team    Total time spent with patient: 35 minutes.     Tanesha Starkey NP

## 2022-04-22 ENCOUNTER — APPOINTMENT (OUTPATIENT)
Dept: NON INVASIVE DIAGNOSTICS | Age: 67
End: 2022-04-22
Attending: INTERNAL MEDICINE
Payer: MEDICARE

## 2022-04-22 VITALS
BODY MASS INDEX: 26.82 KG/M2 | HEIGHT: 71 IN | DIASTOLIC BLOOD PRESSURE: 64 MMHG | SYSTOLIC BLOOD PRESSURE: 116 MMHG | HEART RATE: 61 BPM | WEIGHT: 191.58 LBS | TEMPERATURE: 98 F | OXYGEN SATURATION: 96 % | RESPIRATION RATE: 18 BRPM

## 2022-04-22 LAB
ECHO AO ASC DIAM: 3 CM
ECHO AO ASCENDING AORTA INDEX: 1.45 CM/M2
ECHO AO DESC DIAM: 2.5 CM
ECHO AO DESCENDING AORTA INDEX: 1.21 CM/M2
ECHO AO ROOT DIAM: 3.6 CM
ECHO AO ROOT INDEX: 1.74 CM/M2
ECHO AV AREA PEAK VELOCITY: 2.3 CM2
ECHO AV AREA/BSA PEAK VELOCITY: 1.1 CM2/M2
ECHO AV PEAK GRADIENT: 10 MMHG
ECHO AV PEAK VELOCITY: 1.6 M/S
ECHO AV VELOCITY RATIO: 0.69
ECHO EST RA PRESSURE: 3 MMHG
ECHO IVC PROX: 1.9 CM
ECHO LA AREA 4C: 26.3 CM2
ECHO LA DIAMETER INDEX: 1.93 CM/M2
ECHO LA DIAMETER: 4 CM
ECHO LA MAJOR AXIS: 6.2 CM
ECHO LA TO AORTIC ROOT RATIO: 1.11
ECHO LV E' LATERAL VELOCITY: 5 CM/S
ECHO LV E' SEPTAL VELOCITY: 4 CM/S
ECHO LV EDV A4C: 150 ML
ECHO LV EDV INDEX A4C: 72 ML/M2
ECHO LV EJECTION FRACTION A4C: 44 %
ECHO LV EJECTION FRACTION BIPLANE: 49 % (ref 55–100)
ECHO LV ESV A4C: 84 ML
ECHO LV ESV INDEX A4C: 41 ML/M2
ECHO LV FRACTIONAL SHORTENING: 26 % (ref 28–44)
ECHO LV INTERNAL DIMENSION DIASTOLE INDEX: 2.75 CM/M2
ECHO LV INTERNAL DIMENSION DIASTOLIC: 5.7 CM (ref 4.2–5.9)
ECHO LV INTERNAL DIMENSION SYSTOLIC INDEX: 2.03 CM/M2
ECHO LV INTERNAL DIMENSION SYSTOLIC: 4.2 CM
ECHO LV IVSD: 1.2 CM (ref 0.6–1)
ECHO LV MASS 2D: 272.5 G (ref 88–224)
ECHO LV MASS INDEX 2D: 131.6 G/M2 (ref 49–115)
ECHO LV POSTERIOR WALL DIASTOLIC: 1.1 CM (ref 0.6–1)
ECHO LV RELATIVE WALL THICKNESS RATIO: 0.39
ECHO LVOT AREA: 3.5 CM2
ECHO LVOT DIAM: 2.1 CM
ECHO LVOT PEAK GRADIENT: 5 MMHG
ECHO LVOT PEAK VELOCITY: 1.1 M/S
ECHO MV A VELOCITY: 0.85 M/S
ECHO MV E DECELERATION TIME (DT): 299 MS
ECHO MV E VELOCITY: 0.47 M/S
ECHO MV E/A RATIO: 0.55
ECHO MV E/E' LATERAL: 9.4
ECHO MV E/E' RATIO (AVERAGED): 10.58
ECHO MV E/E' SEPTAL: 11.75
ECHO MV MAX VELOCITY: 1 M/S
ECHO MV MEAN GRADIENT: 1 MMHG
ECHO MV MEAN VELOCITY: 0.5 M/S
ECHO MV PEAK GRADIENT: 4 MMHG
ECHO MV REGURGITANT PEAK GRADIENT: 23 MMHG
ECHO MV REGURGITANT PEAK VELOCITY: 2.4 M/S
ECHO MV VTI: 35.8 CM
ECHO RIGHT VENTRICULAR SYSTOLIC PRESSURE (RVSP): 19 MMHG
ECHO RV TAPSE: 2 CM (ref 1.7–?)
ECHO TV REGURGITANT MAX VELOCITY: 2 M/S
ECHO TV REGURGITANT PEAK GRADIENT: 16 MMHG

## 2022-04-22 PROCEDURE — G0378 HOSPITAL OBSERVATION PER HR: HCPCS

## 2022-04-22 PROCEDURE — 93306 TTE W/DOPPLER COMPLETE: CPT

## 2022-04-22 PROCEDURE — 74011250637 HC RX REV CODE- 250/637: Performed by: INTERNAL MEDICINE

## 2022-04-22 PROCEDURE — 74011250637 HC RX REV CODE- 250/637: Performed by: HOSPITALIST

## 2022-04-22 PROCEDURE — 74011000250 HC RX REV CODE- 250: Performed by: HOSPITALIST

## 2022-04-22 RX ORDER — MAG HYDROX/ALUMINUM HYD/SIMETH 200-200-20
30 SUSPENSION, ORAL (FINAL DOSE FORM) ORAL
Status: DISCONTINUED | OUTPATIENT
Start: 2022-04-22 | End: 2022-04-22 | Stop reason: HOSPADM

## 2022-04-22 RX ADMIN — ALUMINUM HYDROXIDE, MAGNESIUM HYDROXIDE, AND SIMETHICONE 30 ML: 200; 200; 20 SUSPENSION ORAL at 12:31

## 2022-04-22 RX ADMIN — ASPIRIN 81 MG: 81 TABLET, COATED ORAL at 09:07

## 2022-04-22 RX ADMIN — CLOPIDOGREL BISULFATE 75 MG: 75 TABLET ORAL at 09:08

## 2022-04-22 RX ADMIN — ISOSORBIDE MONONITRATE 30 MG: 30 TABLET, EXTENDED RELEASE ORAL at 09:08

## 2022-04-22 RX ADMIN — SODIUM CHLORIDE, PRESERVATIVE FREE 10 ML: 5 INJECTION INTRAVENOUS at 13:46

## 2022-04-22 RX ADMIN — LISINOPRIL 40 MG: 20 TABLET ORAL at 09:07

## 2022-04-22 RX ADMIN — SODIUM CHLORIDE, PRESERVATIVE FREE 10 ML: 5 INJECTION INTRAVENOUS at 05:30

## 2022-04-22 NOTE — PROGRESS NOTES
Problem: Falls - Risk of  Goal: *Absence of Falls  Description: Document Mariangel Catalan Fall Risk and appropriate interventions in the flowsheet. Outcome: Progressing Towards Goal  Note: Fall Risk Interventions:            Medication Interventions: Bed/chair exit alarm                   Problem: Falls - Risk of  Goal: *Absence of Falls  Description: Document Gabbi Fall Risk and appropriate interventions in the flowsheet.   Outcome: Progressing Towards Goal  Note: Fall Risk Interventions:            Medication Interventions: Bed/chair exit alarm                   Problem: Patient Education: Go to Patient Education Activity  Goal: Patient/Family Education  Outcome: Progressing Towards Goal

## 2022-04-22 NOTE — CONSULTS
ORTHOPEDIC SPINE CONSULT    Patient: Fatou Maddox MRN: [id number]  SSN: [id number]    YOB: 1955  Age: 78 y.o. Sex: male      Subjective:      Fatou Maddox is a 78 y.o. male who is being seen in orthopedic consultation for degenerative disc disease of the cervical spine. The patient was admitted to the hospital for what he describes as a tingling sensation of his face. Those symptoms have subsided now. He has been evaluated by cardiology. The patient states he does have a long history of arthritis of his cervical and lumbar spine. He has had 4 prior lumbar spine surgeries. He frequently goes to a chiropractor for over the last 30 years, with recent visit for tightness of his muscles. He complains of minimal discomfort of his cervical spine at this time. He does complain of right shoulder pain which he has had intermittently for several months now. He was told that he has tendinitis of his right shoulder. He denies any numbness or tingling of his upper extremities. He denies any weakness of his upper extremities. He denies any recent falls. He denies any other musculoskeletal complaints at this time. 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 He denies gait coordination changes. He notes no bowel or bladder dysfunction. No constitutional symptoms present.     Past Medical History:   Diagnosis Date    Arthritis     CAD (coronary artery disease)     patient stated he has 2 stents     Diabetes [facility])     Patient stated diet controlled monitors sugar daily not currently on medications    GERD (gastroesophageal reflux disease)     Heart attack [facility])     June 2020    Hyperlipidemia     Hypertension     Ill-defined condition     patient stated approx 2 weeks ago he had a knot in his left leg after wearing tight socks went to his PCP was told it was a blood clot and was told to apply heat he stated it has gotten better stated small knot remains pain had ceased as well as swelling    Sleep apnea     Patient stated he had a CPAP in the past no longer has to use      Past Surgical History:   Procedure Laterality Date    HX COLONOSCOPY      HX ORTHOPAEDIC      pt states he's had 4 back surgeries    [place] ABDOMEN SURGERY PROC UNLISTED      hernia repair times 3    IN CARDIAC SURG PROCEDURE UNLIST      cardiac cath 2020      Family History   Problem Relation Age of Onset    Heart Disease Mother     Heart Disease Father      Social History     Tobacco Use    Smoking status: Former Smoker     Packs/day: 0.50    Smokeless tobacco: Never Used   Substance Use Topics    Alcohol use: Not Currently     Comment: Used to only drink on special occassions      Prior to Admission medications    Medication Sig Start Date End Date Taking? Authorizing Provider   hydroCHLOROthiazide (HYDRODIURIL) 25 mg tablet Take 25 mg by mouth daily. 4/4/22   Provider, Historical   lisinopriL (PRINIVIL, ZESTRIL) 40 mg tablet Take 1 Tablet by mouth daily. 12/17/21   Favian Ho MD   atorvastatin (LIPITOR) 40 mg tablet Take 1 Tablet by mouth every evening. 12/2/21   Provider, Historical   clopidogreL (Plavix) 75 mg tab Take 75 mg by mouth daily. Provider, Historical   aspirin delayed-release 81 mg tablet Take 81 mg by mouth daily. Provider, Historical       Allergies   Allergen Reactions    Penicillins Other (comments)     Patient stated as a child had convulsions, nausea and vomiting        Review of Systems:  Review of Systems   Constitutional: Negative. HENT: Negative. Eyes: Negative. Respiratory: Negative. Cardiovascular: Negative. Gastrointestinal: Negative. Genitourinary: Negative. Musculoskeletal: Negative. Skin: Negative. Neurological: Negative. Endo/Heme/Allergies: Negative. Psychiatric/Behavioral: Negative.           Objective:     Current Facility-Administered Medications   Medication Dose Route Frequency    alum-mag hydroxide-simeth (MYLANTA) oral suspension 30 mL  30 mL Oral TID WITH MEALS    clopidogreL (PLAVIX) tablet 75 mg  75 mg Oral DAILY    aspirin delayed-release tablet 81 mg  81 mg Oral DAILY    atorvastatin (LIPITOR) tablet 40 mg  40 mg Oral QPM    lisinopriL (PRINIVIL, ZESTRIL) tablet 40 mg  40 mg Oral DAILY    sodium chloride (NS) flush 5-40 mL  5-40 mL IntraVENous Q8H    sodium chloride (NS) flush 5-40 mL  5-40 mL IntraVENous PRN    nitroglycerin (NITROSTAT) tablet 0.4 mg  0.4 mg SubLINGual Q5MIN PRN    acetaminophen (TYLENOL) tablet 650 mg  650 mg Oral Q6H PRN    enoxaparin (LOVENOX) injection 40 mg  40 mg SubCUTAneous Q24H    isosorbide mononitrate ER (IMDUR) tablet 30 mg  30 mg Oral DAILY      Vitals:    04/21/22 2014 04/21/22 2330 04/22/22 0241 04/22/22 0736   BP: (!) 149/78 (!) 143/71 (!) 140/69 126/69   Pulse: (!) 55 (!) 54 (!) 53 (!) 53   Resp: 20 21 20 18   Temp: 97.9 °F (36.6 °C) 98.1 °F (36.7 °C) 97.9 °F (36.6 °C) 97.9 °F (36.6 °C)   SpO2: 96% 97% 98% 97%   Weight:       Height:            Alert and oriented x3, No apparent distress    Physical Exam:  Cervical spine: There is full range of motion of the cervical spine without tenderness. No tenderness to palpation trapezii or paraspinal muscles. Upper extremities: There is full range of motion of his bilateral upper extremities without discomfort. Strength of bilateral upper extremities 5 out of 5 C5-T1 distribution. Sensation to both arms and legs preserved. Radial pulses palpable. Cap refill is 2 seconds. DTRs equal throughout bilateral upper extremities and diminished. Mathews's sign is absent. Bilateral upper extremities neurovascularly intact.     Labs:  CBC:  Recent Labs     04/20/22  1624   WBC 9.5   RBC 5.05   HGB 14.9   HCT 43.4   MCV 85.9   RDW 13.5        CHEMISTRIES:  Recent Labs     04/21/22  0837 04/20/22  1624    141   K 3.9 3.7   * 108   CO2 28 29   BUN 22* 21*   CREA 1.35* 1.51*   CA 8. 3* 8.6   PT/INR:No results for input(s): INR, INREXT, INREXT in the last 72 hours. No lab exists for component: PROTIME  APTT:No results for input(s): APTT in the last 72 hours. LIVER PROFILE:  Recent Labs     04/20/22  1624   AST 29   ALT 27       IMAGING:  CT scan taken of her cervical spine yesterday shows multilevel cervical spondylosis with severe canal stenosis and bilateral neuroforaminal stenosis at C5 C7. No acute fractures seen. MRI taken of his brain today shows no acute hemorrhage or infarction. There are findings suggestive of vertebrobasilar insufficiency. Assessment/Plan:     Hospital Problems  Date Reviewed: 4/20/2022          Codes Class Noted POA    Headache ICD-10-CM: R51.9  ICD-9-CM: 784.0  4/21/2022 Unknown        * (Principal) TIA (transient ischemic attack) ICD-10-CM: G45.9  ICD-9-CM: 435.9  4/21/2022 Unknown        Chest pain ICD-10-CM: R07.9  ICD-9-CM: 786.50  11/23/2021 Yes            Degenerative disease/spondylosis with C5-C7 canal and foraminal stenosis - asymptomatic. No acute spine surgical intervention needed at this time. Symptoms of his facial tingling do not appear to be from his cervical spine. Patient can follow-up with his chiropractor as needed. He can follow up with me as an outpatient as needed for symptoms of cervical radiculopathy or myelopathy, which I detailed for him. Orthopedics will sign off at this time. Thank you for the courtesy of this consult.     Signed By: Babak See MD     April 22, 2022

## 2022-04-22 NOTE — DISCHARGE SUMMARY
Physician Discharge Summary     Patient ID:    Margaret Pichardo  [id number]  13 y.o.  1955    Admit date: 4/20/2022    Discharge date : 4/22/2022    Chronic Diagnoses:    Problem List as of 4/22/2022 Date Reviewed: 4/20/2022          Codes Class Noted - Resolved    Headache ICD-10-CM: R51.9  ICD-9-CM: 784.0  4/21/2022 - Present        * (Principal) TIA (transient ischemic attack) ICD-10-CM: G45.9  ICD-9-CM: 435.9  4/21/2022 - Present        Bradycardia ICD-10-CM: R00.1  ICD-9-CM: 427.89  12/16/2021 - Present        Chest pain ICD-10-CM: R07.9  ICD-9-CM: 786.50  11/23/2021 - Present          22    Final Diagnoses:   Chest pain [R07.9]    Reason for Hospitalization:    Aline oral numbness    Hospital Course:     78year-old admitted for suspected TIA symptoms. MRI brain unremarkable. Seen in consultation by cardiologist for asymptomatic bradycardia which resolved. MRI cervical spine showed degenerative joint disease. Seen in consultation by orthopedic surgeon without any acute needs            Discharge Medications:   Current Discharge Medication List      CONTINUE these medications which have NOT CHANGED    Details   hydroCHLOROthiazide (HYDRODIURIL) 25 mg tablet Take 25 mg by mouth daily. lisinopriL (PRINIVIL, ZESTRIL) 40 mg tablet Take 1 Tablet by mouth daily. Qty: 30 Tablet, Refills: 0      atorvastatin (LIPITOR) 40 mg tablet Take 1 Tablet by mouth every evening. clopidogreL (Plavix) 75 mg tab Take 75 mg by mouth daily. aspirin delayed-release 81 mg tablet Take 81 mg by mouth daily. Follow up Care:    1. Christopher Trinh MD in 1-2 weeks. Please call to set up an appointment shortly after discharge. Diet:  Cardiac Diet    Disposition:  Home.     Advanced Directive:   FULL    DNR      Discharge Exam:  Visit Vitals  /69 (BP 1 Location: Left upper arm, BP Patient Position: At rest)   Pulse (!) 53   Temp 97.9 °F (36.6 °C)   Resp 18   Ht 5' 11\" (1.803 m)   Wt 86.9 kg (191 lb 9.3 oz)   SpO2 97%   BMI 26.72 kg/m²      O2 Device: None (Room air)    Temp (24hrs), Av °F (36.7 °C), Min:97.9 °F (36.6 °C), Max:98.1 °F (36.7 °C)    No intake/output data recorded. No intake/output data recorded. General:  Alert, cooperative, no distress, appears stated age. Lungs:   Clear to auscultation bilaterally. Chest wall:  No tenderness or deformity. Heart:  Regular rate and rhythm, S1, S2 normal, no murmur, click, rub or gallop. Abdomen:   Soft, non-tender. Bowel sounds normal. No masses,  No organomegaly. Extremities: Extremities normal, atraumatic, no cyanosis or edema. Pulses: 2+ and symmetric all extremities. Skin: Skin color, texture, turgor normal. No rashes or lesions   Neurologic: CNII-XII intact. No gross sensory or motor deficits         CONSULTATIONS: Cardiology    Significant Diagnostic Studies:   2022: BUN 21 mg/dL (H; Ref range: 6 - 20 mg/dL); Calcium 8.6 mg/dL (Ref range: 8.5 - 10.1 mg/dL); CO2 29 mmol/L (Ref range: 21 - 32 mmol/L); Creatinine 1.51 mg/dL (H; Ref range: 0.70 - 1.30 mg/dL); Glucose 118 mg/dL (H; Ref range: 65 - 100 mg/dL); HCT 43.4 % (Ref range: 36.6 - 50.3 %); HGB 14.9 g/dL (Ref range: 12.1 - 17.0 g/dL); Potassium 3.7 mmol/L (Ref range: 3.5 - 5.1 mmol/L); Sodium 141 mmol/L (Ref range: 136 - 145 mmol/L)  2022: BUN 22 mg/dL (H; Ref range: 6 - 20 mg/dL); Calcium 8.3 mg/dL (L; Ref range: 8.5 - 10.1 mg/dL); CO2 28 mmol/L (Ref range: 21 - 32 mmol/L); Creatinine 1.35 mg/dL (H; Ref range: 0.70 - 1.30 mg/dL); Glucose 132 mg/dL (H; Ref range: 65 - 100 mg/dL); Potassium 3.9 mmol/L (Ref range: 3.5 - 5.1 mmol/L);  Sodium 144 mmol/L (Ref range: 136 - 145 mmol/L)  Recent Labs     22  1624   WBC 9.5   HGB 14.9   HCT 43.4        Recent Labs     22  0837 22  1624    141   K 3.9 3.7   * 108   CO2 28 29   BUN 22* 21*   CREA 1.35* 1.51*   * 118*   CA 8.3* 8.6     Recent Labs 04/20/22  1624   ALT 27   AP 68   TBILI 0.5   TP 7.2   ALB 4.1   GLOB 3.1     No results for input(s): INR, PTP, APTT, INREXT in the last 72 hours. No results for input(s): FE, TIBC, PSAT, FERR in the last 72 hours. No results for input(s): PH, PCO2, PO2 in the last 72 hours. No results for input(s): CPK, CKMB in the last 72 hours.     No lab exists for component: TROPONINI  Lab Results   Component Value Date/Time    Glucose (POC) 144 (H) 04/21/2022 07:36 AM    Glucose (POC) 145 (H) 04/20/2022 10:06 PM       Total Time: 30 minutes    Signed:  Shala Maurice MD  4/22/2022  10:20 AM

## 2022-04-22 NOTE — CONSULTS
Consult Date: 4/22/2022    Consults Mr. Santina Kanner is a 78year old man with HTN, HL, CAD, prior stroke with no residual deficits who came in 2 days ago for symptoms of hilda oral paresthesias. He has been active all day and kept having this symptom persistently. On presentation in the Er he was found to be bradycardic to 30s and BP was 180/81. He is being followed by cardiology. He had an mri done that did not show an acute stroke. Symptoms of isolated hilda oral numbness not consistent with stroke or TIA. Subjective  complains of abdominal pain after breakfast this morning. Has not had a BM in 2 days.  Has one episode of emesis this am after breakfast.     Past Medical History:   Diagnosis Date    Arthritis     CAD (coronary artery disease)     patient stated he has 2 stents     Diabetes ([address]     Patient stated diet controlled monitors sugar daily not currently on medications    GERD (gastroesophageal reflux disease)     Heart attack [facility])     June 2020    Hyperlipidemia     Hypertension     Ill-defined condition     patient stated approx 2 weeks ago he had a knot in his left leg after wearing tight socks went to his PCP was told it was a blood clot and was told to apply heat he stated it has gotten better stated small knot remains pain had ceased as well as swelling    Sleep apnea     Patient stated he had a CPAP in the past no longer has to use       Past Surgical History:   Procedure Laterality Date    HX COLONOSCOPY      HX ORTHOPAEDIC      pt states he's had 4 back surgeries    [place] ABDOMEN SURG[place] PROC UN[place]ED      hernia repair times 3    [place] CARDIAC SUR[place] PROCEDURE [place]      cardiac cath 2020     Family History   Problem Relation Age of Onset    Heart Disease Mother     Heart Disease Father       Social History     Tobacco Use    Smoking status: Former Smoker     Packs/day: 0.50    Smokeless tobacco: Never Used   Substance Use Topics    Alcohol use: Not Currently     Comment: Used to only drink on special occassions       Current Facility-Administered Medications   Medication Dose Route Frequency Provider Last Rate Last Admin    clopidogreL (PLAVIX) tablet 75 mg  75 mg Oral DAILY Darek Washington MD   75 mg at 04/21/22 9252    aspirin delayed-release tablet 81 mg  81 mg Oral DAILY Darek Washington MD   81 mg at 04/21/22 0851    atorvastatin (LIPITOR) tablet 40 mg  40 mg Oral QPM Darek Washington MD   40 mg at 04/21/22 1813    lisinopriL (PRINIVIL, ZESTRIL) tablet 40 mg  40 mg Oral DAILY Darek Washington MD   40 mg at 04/21/22 0852    sodium chloride (NS) flush 5-40 mL  5-40 mL IntraVENous Q8H Darek Washington MD   10 mL at 04/22/22 0530    sodium chloride (NS) flush 5-40 mL  5-40 mL IntraVENous PRN Darek Washington MD        nitroglycerin (NITROSTAT) tablet 0.4 mg  0.4 mg SubLINGual Q5MIN PRN Darek Washington MD        acetaminophen (TYLENOL) tablet 650 mg  650 mg Oral Q6H PRN Darek Washington MD        enoxaparin (LOVENOX) injection 40 mg  40 mg SubCUTAneous Q24H Darek Washington MD   40 mg at 04/21/22 2133    isosorbide mononitrate ER (IMDUR) tablet 30 mg  30 mg Oral DAILY Darek Washington MD   30 mg at 04/21/22 2134        Review of Systems   Gastrointestinal: Positive for abdominal pain. All other systems reviewed and are negative. Objective     Vital signs for last 24 hours:  Visit Vitals  /69 (BP 1 Location: Left upper arm, BP Patient Position: At rest)   Pulse (!) 53   Temp 97.9 °F (36.6 °C)   Resp 18   Ht 5' 11\" (1.803 m)   Wt 86.9 kg (191 lb 9.3 oz)   SpO2 97%   BMI 26.72 kg/m²       Intake/Output this shift:  Current Shift: No intake/output data recorded. Last 3 Shifts: No intake/output data recorded.     Data Review:   Recent Results (from the past 24 hour(s))   DUPLEX CAROTID BILATERAL    Collection Time: 04/21/22 10:52 AM   Result Value Ref Range    Left subclavian mid .0 cm/s    Left subclavian mid EDV 4.8 cm/s    Right subclavian mid PSV 99.3 cm/s    Right subclavian mid EDV 0.0 cm/s    Left arm  mmHg    Right arm  mmHg    Right CCA prox sys 118.0 cm/s    Right CCA prox devlin 11.5 cm/s    Right cca dist sys 121.0 cm/s    Right CCA dist devlin 19.1 cm/s    Right ICA prox sys 114.0 cm/s    Right ICA prox devlin 19.9 cm/s    Right ICA dist sys 90.9 cm/s    Right ICA dist devlin 18.3 cm/s    Right eca sys 124.0 cm/s    Right vertebral sys 62.9 cm/s    Right ICA/CCA sys 0.94     Left CCA prox sys 101.0 cm/s    Left CCA prox devlin 19.2 cm/s    Left CCA dist sys 89.4 cm/s    Left CCA dist devlin 19.1 cm/s    Left ICA prox sys 111.0 cm/s    Left ICA prox devlin 36.7 cm/s    Left ICA dist sys 121.0 cm/s    Left ICA dist devlin 25.8 cm/s    Left ECA sys 165.0 cm/s    RIGHT VERTEBRAL ARTERY D 2.8 cm/s    RIGHT EXTERNAL CAROTID ARTERY D 0.0 cm/s    LEFT EXTERNAL CAROTID ARTERY D 8.6 cm/s       Physical Exam    Neuro Physical Exam      General: Well developed, well nourished. Patient in no apparent distress. Cardiac: Regular rate and rhythm       Neurological Exam:  Mental Status: Awake, alert, oriented x4   Cranial Nerves:   Intact visual fields. PERRL, EOM's full, no nystagmus, no ptosis. Facial sensation is normal. Facial movement is symmetric. Palate is midline. Normal sternocleidomastoid strength. Tongue is midline. Hearing is intact bilaterally. Motor:  5/5 strength in upper and lower proximal and distal muscles. Normal bulk and tone. Reflexes:   Deep tendon reflexes 2+/4 and symmetrical.   Sensory:   Normal to light touch in all 4 ext    Gait:  defferred (patient in pain)   Tremor:   No tremor noted. Cerebellar:  No cerebellar signs present. Babinski:      Down b/l     Assessment and Plan: Mr. Adis Pace is a 78year old man with hilda oral numbness now resolved which could have been secondary to hemodynamic issues. This by itself is Not a symptom of TIA or stroke. MRI negative for acute stroke. Will sign off. Abdominal pain: likely from constipation. Informed nurse.

## 2022-04-22 NOTE — PROGRESS NOTES
5414  I have reviewed discharge instructions with the patient. The patient verbalized understanding. Discharge medications reviewed with patient and appropriate educational materials and side effects teaching were provided. Patient taken down to front lobby by RN to awaiting family for discharge. Current Discharge Medication List      CONTINUE these medications which have NOT CHANGED    Details   hydroCHLOROthiazide (HYDRODIURIL) 25 mg tablet Take 25 mg by mouth daily. lisinopriL (PRINIVIL, ZESTRIL) 40 mg tablet Take 1 Tablet by mouth daily. Qty: 30 Tablet, Refills: 0      atorvastatin (LIPITOR) 40 mg tablet Take 1 Tablet by mouth every evening. clopidogreL (Plavix) 75 mg tab Take 75 mg by mouth daily. aspirin delayed-release 81 mg tablet Take 81 mg by mouth daily.

## 2022-04-22 NOTE — PROGRESS NOTES
Progress Note      4/22/2022 08:17 AM  NAME: Seth Patel   MRN:  [id number]   Admit Diagnosis: Chest pain [R07.9]      Problem List:   -Admitted to the hospital with TIA-like symptoms  -Cardiology consult was invited secondary to bradycardia  -History of coronary artery disease status post LAD PCI following STEMI in March 2021. He also underwent PCI to LAD in 11/21.  -Patient is active able to perform regular activities of daily living.  -He is compliant to the medications including aspirin and Plavix  -At the time of my examination patient is otherwise clinically and hemodynamically stable. Assessment/Plan:   4/22/22  - patient observed resting in bed with eyes opened. Denies complaints. - no acute cardiovascular events reported overnight  - remains in sinus bradycardia without blocks or pauses. - blood pressure is hemodynamically stable  - carotid performed on 4/21/22 shows less than 50% echodense plaque to the SANA and less than 50% smooth and irregular echodense plaque to the LICA. - Echocardiogram performed on 12/22/21 shows an EF of 50%-55% with akinesis of apical septum   - repeat echocardiogram pending, may perform on an outpatient basis. - etiology of bradycardia possibly secondary to multiple etiologies including but not limited to medication side effects. - continue dual antiplatelet therapy, atorvastatin, and lisinopril  - patient may follow up in our office 1-2 weeks post discharge. - Thank you for allowing us to care for Mr. Pauline Lezama  ============================================================           []       High complexity decision making was performed in this patient at high risk for decompensation with multiple organ involvement. Subjective:     Seth Patel denies chest pain, dyspnea. Discussed with RN events overnight. HISTORY OF PRESENT ILLNESS:     Dagmar Hanna is a 78 y.o.    male who was admitted to the hospital with dizziness and lack of coordination of his fingers and some weird sensation on his face. A CT scan was unremarkable so MRI is pending along with ultrasound of the carotids. Cardiology consult was invited secondary to bradycardia with heart rate as low as 37 bpm. Most of the time his heart rate is in mid 50s. Most recent cardiac work-up has been unremarkable including last cardiac catheterization was done in 2020 followed by a mildly abnormal stress test but based on my overall clinical assessment and as patient was active and able to perform regular activities of daily living no invasive cardiac procedure was performed. Until last week he was cutting weeds and involving externus activities. We will check echocardiogram continue to monitor him on telemetry and then make further cardiac management decision as clinically warranted. Review of Systems:    Symptom Y/N Comments  Symptom Y/N Comments   Fever/Chills N   Chest Pain N    Poor Appetite N   Edema N    Cough N   Abdominal Pain N    Sputum N   Joint Pain N    SOB/LUIS N   Pruritis/Rash N    Nausea/vomit N   Tolerating PT/OT Y    Diarrhea N   Tolerating Diet Y    Constipation N   Other       Could NOT obtain due to:      Objective:      Physical Exam:    Last 24hrs VS reviewed since prior progress note. Most recent are:    Visit Vitals  /69 (BP 1 Location: Left upper arm, BP Patient Position: At rest)   Pulse (!) 53   Temp 97.9 °F (36.6 °C)   Resp 18   Ht 5' 11\" (1.803 m)   Wt 86.9 kg (191 lb 9.3 oz)   SpO2 97%   BMI 26.72 kg/m²     No intake or output data in the 24 hours ending 04/22/22 1052     General Appearance: Well developed, well nourished, alert & oriented x 3,    no acute distress. Ears/Nose/Mouth/Throat: Hearing grossly normal.  Neck: Supple. Chest: Lungs clear to auscultation bilaterally. Cardiovascular: Regular rate and rhythm, S1S2 normal, no murmur. Abdomen: Soft, non-tender, bowel sounds are active. Extremities: No edema bilaterally.   Skin: Warm and dry.    PMH/ reviewed - no change compared to H&P    Data Review    Telemetry: sinus bradycardia    EKG:   []  No new EKG for review  MRI BRAIN WO CONT   Final Result   1. No acute intracranial abnormality. Specifically no evidence for acute   hemorrhage, infarction or mass effect. 2. Abnormal high T2 signal is observed within the lumen of the left vertebral   artery which is dominant and the junction of the basilar artery. This high   signal can be seen with either slow flow or occlusion. 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 DUPLEX CAROTID BILATERAL   Final Result      CT SPINE CERV WO CONT   Final Result   1. No acute osseous abnormality of the cervical spine. 2.  Advanced multilevel cervical spondylosis including moderate to severe spinal   canal stenosis and severe bilateral neuroforaminal narrowing at C5-C7. XR CHEST PORT   Final Result   No acute findings. Lab Data Personally Reviewed:    Recent Labs     04/20/22  1624   WBC 9.5   HGB 14.9   HCT 43.4        No results for input(s): INR, PTP, APTT, INREXT in the last 72 hours. Recent Labs     04/21/22  0837 04/20/22  1624    141   K 3.9 3.7   * 108   CO2 28 29   BUN 22* 21*   CREA 1.35* 1.51*   * 118*   CA 8.3* 8.6     No results for input(s): CPK, CKNDX, TROIQ in the last 72 hours. No lab exists for component: CPKMB  Lab Results   Component Value Date/Time    Cholesterol, total 149 02/28/2022 11:10 AM    HDL Cholesterol 30 02/28/2022 11:10 AM    LDL, calculated 85 02/28/2022 11:10 AM    Triglyceride 170 (H) 02/28/2022 11:10 AM    CHOL/HDL Ratio 5.0 02/28/2022 11:10 AM       Recent Labs     04/20/22  1624   AP 68   TP 7.2   ALB 4.1   GLOB 3.1     No results for input(s): PH, PCO2, PO2 in the last 72 hours.     Medications Personally Reviewed:    Current Facility-Administered Medications   Medication Dose Route Frequency    alum-mag hydroxide-simeth (MYLANTA) oral suspension 30 mL  30 mL Oral TID WITH MEALS    clopidogreL (PLAVIX) tablet 75 mg  75 mg Oral DAILY    aspirin delayed-release tablet 81 mg  81 mg Oral DAILY    atorvastatin (LIPITOR) tablet 40 mg  40 mg Oral QPM    lisinopriL (PRINIVIL, ZESTRIL) tablet 40 mg  40 mg Oral DAILY    sodium chloride (NS) flush 5-40 mL  5-40 mL IntraVENous Q8H    sodium chloride (NS) flush 5-40 mL  5-40 mL IntraVENous PRN    nitroglycerin (NITROSTAT) tablet 0.4 mg  0.4 mg SubLINGual Q5MIN PRN    acetaminophen (TYLENOL) tablet 650 mg  650 mg Oral Q6H PRN    enoxaparin (LOVENOX) injection 40 mg  40 mg SubCUTAneous Q24H    isosorbide mononitrate ER (IMDUR) tablet 30 mg  30 mg Oral DAILY         Doroteo Aguilera, JOELLEN

## 2022-04-22 NOTE — PROGRESS NOTES
OT eval order received and acknowledged. Pt screened and demonstrating baseline independence for self care tasks and functional mobility/transfers. Pt observed to ambulate to bathroom and complete bathroom commode transfer w/ no LOB noted. He INDly completed oral hygiene and combed hair standing at sinktop. Pt reports no need for skilled OT services at this time. OT evaluation order will therefore be discontinued this pt has no acute OT needs. Please reorder OT if pt's functional status changes. Thank you.

## 2022-05-18 ENCOUNTER — TRANSCRIBE ORDER (OUTPATIENT)
Dept: SCHEDULING | Age: 67
End: 2022-05-18

## 2022-05-18 DIAGNOSIS — G95.9 CERVICAL MYELOPATHY (HCC): Primary | ICD-10-CM

## 2022-06-21 ENCOUNTER — HOSPITAL ENCOUNTER (OUTPATIENT)
Dept: LAB | Age: 67
Discharge: HOME | End: 2022-06-21
Attending: ORTHOPAEDIC SURGERY
Payer: MEDICARE

## 2022-06-21 ENCOUNTER — HOSPITAL ENCOUNTER (OUTPATIENT)
Dept: MRI IMAGING | Age: 67
Discharge: HOME | End: 2022-06-21
Attending: ORTHOPAEDIC SURGERY
Payer: MEDICARE

## 2022-06-21 ENCOUNTER — TRANSCRIBE ORDER (OUTPATIENT)
Dept: REGISTRATION | Age: 67
End: 2022-06-21

## 2022-06-21 DIAGNOSIS — E11.9 DIABETES MELLITUS TYPE 2, CONTROLLED (HCC): ICD-10-CM

## 2022-06-21 DIAGNOSIS — E78.2 MIXED HYPERLIPIDEMIA: ICD-10-CM

## 2022-06-21 DIAGNOSIS — G95.9 CERVICAL MYELOPATHY (HCC): ICD-10-CM

## 2022-06-21 DIAGNOSIS — Z12.5 PROSTATE CANCER SCREENING: Primary | ICD-10-CM

## 2022-06-21 DIAGNOSIS — Z12.5 PROSTATE CANCER SCREENING: ICD-10-CM

## 2022-06-21 LAB
ALT SERPL-CCNC: 19 U/L (ref 12–78)
APPEARANCE UR: CLEAR
AST SERPL W P-5'-P-CCNC: 22 U/L (ref 15–37)
BACTERIA URNS QL MICRO: NEGATIVE /HPF
BILIRUB UR QL: NEGATIVE
CA-I BLD-MCNC: 8.9 MG/DL (ref 8.5–10.1)
CHOLEST SERPL-MCNC: 128 MG/DL
COLOR UR: ABNORMAL
CREAT SERPL-MCNC: 1.33 MG/DL (ref 0.7–1.3)
EST. AVERAGE GLUCOSE BLD GHB EST-MCNC: 120 MG/DL
GLUCOSE SERPL-MCNC: 112 MG/DL (ref 65–100)
GLUCOSE UR STRIP.AUTO-MCNC: NEGATIVE MG/DL
HBA1C MFR BLD: 5.8 % (ref 4–5.6)
HGB BLD-MCNC: 14.8 G/DL (ref 12.1–17)
HGB UR QL STRIP: ABNORMAL
KETONES UR QL STRIP.AUTO: NEGATIVE MG/DL
LDLC SERPL DIRECT ASSAY-MCNC: 71 MG/DL (ref 0–100)
LEUKOCYTE ESTERASE UR QL STRIP.AUTO: NEGATIVE
MUCOUS THREADS URNS QL MICRO: ABNORMAL /LPF
NITRITE UR QL STRIP.AUTO: NEGATIVE
PH UR STRIP: 6 [PH] (ref 5–8)
PLATELET # BLD AUTO: 177 K/UL (ref 150–400)
POTASSIUM SERPL-SCNC: 3.8 MMOL/L (ref 3.5–5.1)
PROT UR STRIP-MCNC: NEGATIVE MG/DL
PSA SERPL-MCNC: 5.1 NG/ML (ref 0.01–4)
RBC #/AREA URNS HPF: ABNORMAL /HPF (ref 0–5)
SODIUM SERPL-SCNC: 141 MMOL/L (ref 136–145)
SP GR UR REFRACTOMETRY: 1.01 (ref 1–1.03)
UROBILINOGEN UR QL STRIP.AUTO: 0.1 EU/DL (ref 0.1–1)
WBC # BLD AUTO: 10.6 K/UL (ref 4.1–11.1)
WBC URNS QL MICRO: ABNORMAL /HPF (ref 0–4)

## 2022-06-21 PROCEDURE — 81001 URINALYSIS AUTO W/SCOPE: CPT

## 2022-06-21 PROCEDURE — 82565 ASSAY OF CREATININE: CPT

## 2022-06-21 PROCEDURE — 85018 HEMOGLOBIN: CPT

## 2022-06-21 PROCEDURE — 85049 AUTOMATED PLATELET COUNT: CPT

## 2022-06-21 PROCEDURE — 83721 ASSAY OF BLOOD LIPOPROTEIN: CPT

## 2022-06-21 PROCEDURE — 84478 ASSAY OF TRIGLYCERIDES: CPT

## 2022-06-21 PROCEDURE — 84295 ASSAY OF SERUM SODIUM: CPT

## 2022-06-21 PROCEDURE — 84132 ASSAY OF SERUM POTASSIUM: CPT

## 2022-06-21 PROCEDURE — 84460 ALANINE AMINO (ALT) (SGPT): CPT

## 2022-06-21 PROCEDURE — 82947 ASSAY GLUCOSE BLOOD QUANT: CPT

## 2022-06-21 PROCEDURE — 85048 AUTOMATED LEUKOCYTE COUNT: CPT

## 2022-06-21 PROCEDURE — 82465 ASSAY BLD/SERUM CHOLESTEROL: CPT

## 2022-06-21 PROCEDURE — 84450 TRANSFERASE (AST) (SGOT): CPT

## 2022-06-21 PROCEDURE — 82310 ASSAY OF CALCIUM: CPT

## 2022-06-21 PROCEDURE — 72141 MRI NECK SPINE W/O DYE: CPT

## 2022-06-21 PROCEDURE — 36415 COLL VENOUS BLD VENIPUNCTURE: CPT

## 2022-06-21 PROCEDURE — 84153 ASSAY OF PSA TOTAL: CPT

## 2022-06-21 PROCEDURE — 83036 HEMOGLOBIN GLYCOSYLATED A1C: CPT

## 2022-06-22 LAB
SPECIMEN SOURCE FLD: NORMAL
TRIGL FLD-MCNC: NORMAL MG/DL

## 2022-06-23 LAB — TRIGL SERPL-MCNC: 224 MG/DL (ref ?–150)

## 2022-07-08 ENCOUNTER — HOSPITAL ENCOUNTER (INPATIENT)
Age: 67
LOS: 2 days | Discharge: HOME | DRG: 310 | End: 2022-07-10
Attending: STUDENT IN AN ORGANIZED HEALTH CARE EDUCATION/TRAINING PROGRAM | Admitting: HOSPITALIST
Payer: MEDICARE

## 2022-07-08 ENCOUNTER — APPOINTMENT (OUTPATIENT)
Dept: GENERAL RADIOLOGY | Age: 67
DRG: 310 | End: 2022-07-08
Attending: EMERGENCY MEDICINE
Payer: MEDICARE

## 2022-07-08 DIAGNOSIS — R42 DIZZINESS: Primary | ICD-10-CM

## 2022-07-08 DIAGNOSIS — R00.1 BRADYCARDIA: ICD-10-CM

## 2022-07-08 LAB
ALBUMIN SERPL-MCNC: 4 G/DL (ref 3.5–5)
ALBUMIN/GLOB SERPL: 1.3 {RATIO} (ref 1.1–2.2)
ALP SERPL-CCNC: 57 U/L (ref 45–117)
ALT SERPL-CCNC: 25 U/L (ref 12–78)
ANION GAP SERPL CALC-SCNC: 6 MMOL/L (ref 5–15)
AST SERPL W P-5'-P-CCNC: 19 U/L (ref 15–37)
BASOPHILS # BLD: 0.1 K/UL (ref 0–0.1)
BASOPHILS NFR BLD: 1 % (ref 0–1)
BILIRUB SERPL-MCNC: 0.8 MG/DL (ref 0.2–1)
BUN SERPL-MCNC: 23 MG/DL (ref 6–20)
BUN/CREAT SERPL: 17 (ref 12–20)
CA-I BLD-MCNC: 8.7 MG/DL (ref 8.5–10.1)
CHLORIDE SERPL-SCNC: 109 MMOL/L (ref 97–108)
CO2 SERPL-SCNC: 28 MMOL/L (ref 21–32)
CREAT SERPL-MCNC: 1.35 MG/DL (ref 0.7–1.3)
DIFFERENTIAL METHOD BLD: NORMAL
EOSINOPHIL # BLD: 0.4 K/UL (ref 0–0.4)
EOSINOPHIL NFR BLD: 4 % (ref 0–7)
ERYTHROCYTE [DISTWIDTH] IN BLOOD BY AUTOMATED COUNT: 13.2 % (ref 11.5–14.5)
GLOBULIN SER CALC-MCNC: 3 G/DL (ref 2–4)
GLUCOSE SERPL-MCNC: 163 MG/DL (ref 65–100)
HCT VFR BLD AUTO: 41.8 % (ref 36.6–50.3)
HGB BLD-MCNC: 14.6 G/DL (ref 12.1–17)
IMM GRANULOCYTES # BLD AUTO: 0 K/UL (ref 0–0.04)
IMM GRANULOCYTES NFR BLD AUTO: 0 % (ref 0–0.5)
LYMPHOCYTES # BLD: 2.1 K/UL (ref 0.8–3.5)
LYMPHOCYTES NFR BLD: 22 % (ref 12–49)
MCH RBC QN AUTO: 30.1 PG (ref 26–34)
MCHC RBC AUTO-ENTMCNC: 34.9 G/DL (ref 30–36.5)
MCV RBC AUTO: 86.2 FL (ref 80–99)
MONOCYTES # BLD: 0.5 K/UL (ref 0–1)
MONOCYTES NFR BLD: 5 % (ref 5–13)
NEUTS SEG # BLD: 6.2 K/UL (ref 1.8–8)
NEUTS SEG NFR BLD: 68 % (ref 32–75)
NRBC # BLD: 0 K/UL (ref 0–0.01)
NRBC BLD-RTO: 0 PER 100 WBC
PLATELET # BLD AUTO: 172 K/UL (ref 150–400)
PMV BLD AUTO: 10.3 FL (ref 8.9–12.9)
POTASSIUM SERPL-SCNC: 3.3 MMOL/L (ref 3.5–5.1)
PROT SERPL-MCNC: 7 G/DL (ref 6.4–8.2)
RBC # BLD AUTO: 4.85 M/UL (ref 4.1–5.7)
SODIUM SERPL-SCNC: 143 MMOL/L (ref 136–145)
TROPONIN-HIGH SENSITIVITY: 24 NG/L (ref 0–76)
WBC # BLD AUTO: 9.3 K/UL (ref 4.1–11.1)

## 2022-07-08 PROCEDURE — 65270000029 HC RM PRIVATE

## 2022-07-08 PROCEDURE — 36415 COLL VENOUS BLD VENIPUNCTURE: CPT

## 2022-07-08 PROCEDURE — 99285 EMERGENCY DEPT VISIT HI MDM: CPT

## 2022-07-08 PROCEDURE — 71045 X-RAY EXAM CHEST 1 VIEW: CPT

## 2022-07-08 PROCEDURE — 85025 COMPLETE CBC W/AUTO DIFF WBC: CPT

## 2022-07-08 PROCEDURE — 80053 COMPREHEN METABOLIC PANEL: CPT

## 2022-07-08 PROCEDURE — 93005 ELECTROCARDIOGRAM TRACING: CPT

## 2022-07-08 PROCEDURE — 84484 ASSAY OF TROPONIN QUANT: CPT

## 2022-07-08 RX ORDER — ACETAMINOPHEN 325 MG/1
650 TABLET ORAL
Status: DISCONTINUED | OUTPATIENT
Start: 2022-07-08 | End: 2022-07-10 | Stop reason: HOSPADM

## 2022-07-08 RX ORDER — ASPIRIN 81 MG/1
81 TABLET ORAL DAILY
Status: DISCONTINUED | OUTPATIENT
Start: 2022-07-09 | End: 2022-07-10 | Stop reason: HOSPADM

## 2022-07-08 RX ORDER — SODIUM CHLORIDE 0.9 % (FLUSH) 0.9 %
5-40 SYRINGE (ML) INJECTION AS NEEDED
Status: DISCONTINUED | OUTPATIENT
Start: 2022-07-08 | End: 2022-07-10 | Stop reason: HOSPADM

## 2022-07-08 RX ORDER — ATORVASTATIN CALCIUM 40 MG/1
40 TABLET, FILM COATED ORAL EVERY EVENING
Status: DISCONTINUED | OUTPATIENT
Start: 2022-07-09 | End: 2022-07-10 | Stop reason: HOSPADM

## 2022-07-08 RX ORDER — HYDROCHLOROTHIAZIDE 25 MG/1
12.5 TABLET ORAL DAILY
Status: DISCONTINUED | OUTPATIENT
Start: 2022-07-09 | End: 2022-07-10 | Stop reason: HOSPADM

## 2022-07-08 RX ORDER — CLOPIDOGREL BISULFATE 75 MG/1
75 TABLET ORAL DAILY
Status: DISCONTINUED | OUTPATIENT
Start: 2022-07-09 | End: 2022-07-10 | Stop reason: HOSPADM

## 2022-07-08 RX ORDER — HYDRALAZINE HYDROCHLORIDE 25 MG/1
25 TABLET, FILM COATED ORAL 3 TIMES DAILY
Status: DISCONTINUED | OUTPATIENT
Start: 2022-07-09 | End: 2022-07-09

## 2022-07-08 RX ORDER — HYDRALAZINE HYDROCHLORIDE 50 MG/1
25 TABLET, FILM COATED ORAL 3 TIMES DAILY
COMMUNITY
Start: 2022-06-09

## 2022-07-08 RX ORDER — SODIUM CHLORIDE 0.9 % (FLUSH) 0.9 %
5-40 SYRINGE (ML) INJECTION EVERY 8 HOURS
Status: DISCONTINUED | OUTPATIENT
Start: 2022-07-08 | End: 2022-07-10 | Stop reason: HOSPADM

## 2022-07-08 RX ORDER — ISOSORBIDE MONONITRATE 60 MG/1
60 TABLET, EXTENDED RELEASE ORAL DAILY
COMMUNITY
Start: 2022-06-06

## 2022-07-08 RX ORDER — LISINOPRIL 10 MG/1
40 TABLET ORAL DAILY
Status: DISCONTINUED | OUTPATIENT
Start: 2022-07-09 | End: 2022-07-10 | Stop reason: HOSPADM

## 2022-07-08 RX ORDER — ISOSORBIDE MONONITRATE 30 MG/1
30 TABLET, EXTENDED RELEASE ORAL DAILY
Status: DISCONTINUED | OUTPATIENT
Start: 2022-07-09 | End: 2022-07-10 | Stop reason: HOSPADM

## 2022-07-08 RX ORDER — DOCUSATE SODIUM 100 MG/1
100 CAPSULE, LIQUID FILLED ORAL 2 TIMES DAILY
Status: DISCONTINUED | OUTPATIENT
Start: 2022-07-08 | End: 2022-07-10 | Stop reason: HOSPADM

## 2022-07-09 LAB
ANION GAP SERPL CALC-SCNC: 4 MMOL/L (ref 5–15)
BUN SERPL-MCNC: 22 MG/DL (ref 6–20)
BUN/CREAT SERPL: 17 (ref 12–20)
CA-I BLD-MCNC: 8.5 MG/DL (ref 8.5–10.1)
CHLORIDE SERPL-SCNC: 107 MMOL/L (ref 97–108)
CO2 SERPL-SCNC: 29 MMOL/L (ref 21–32)
CREAT SERPL-MCNC: 1.3 MG/DL (ref 0.7–1.3)
GLUCOSE SERPL-MCNC: 140 MG/DL (ref 65–100)
POTASSIUM SERPL-SCNC: 3.3 MMOL/L (ref 3.5–5.1)
SODIUM SERPL-SCNC: 140 MMOL/L (ref 136–145)
TSH SERPL DL<=0.05 MIU/L-ACNC: 3.06 UIU/ML (ref 0.36–3.74)

## 2022-07-09 PROCEDURE — 74011000250 HC RX REV CODE- 250: Performed by: HOSPITALIST

## 2022-07-09 PROCEDURE — 80048 BASIC METABOLIC PNL TOTAL CA: CPT

## 2022-07-09 PROCEDURE — 74011250637 HC RX REV CODE- 250/637: Performed by: HOSPITALIST

## 2022-07-09 PROCEDURE — 84443 ASSAY THYROID STIM HORMONE: CPT

## 2022-07-09 PROCEDURE — 65270000029 HC RM PRIVATE

## 2022-07-09 PROCEDURE — 36415 COLL VENOUS BLD VENIPUNCTURE: CPT

## 2022-07-09 RX ORDER — HYDRALAZINE HYDROCHLORIDE 25 MG/1
25 TABLET, FILM COATED ORAL 3 TIMES DAILY
Status: DISCONTINUED | OUTPATIENT
Start: 2022-07-09 | End: 2022-07-10 | Stop reason: HOSPADM

## 2022-07-09 RX ADMIN — SODIUM CHLORIDE, PRESERVATIVE FREE 10 ML: 5 INJECTION INTRAVENOUS at 02:17

## 2022-07-09 RX ADMIN — HYDRALAZINE HYDROCHLORIDE 25 MG: 25 TABLET, FILM COATED ORAL at 09:43

## 2022-07-09 RX ADMIN — SODIUM CHLORIDE, PRESERVATIVE FREE 10 ML: 5 INJECTION INTRAVENOUS at 21:49

## 2022-07-09 RX ADMIN — ATORVASTATIN CALCIUM 40 MG: 40 TABLET, FILM COATED ORAL at 17:04

## 2022-07-09 RX ADMIN — CLOPIDOGREL BISULFATE 75 MG: 75 TABLET ORAL at 09:43

## 2022-07-09 RX ADMIN — SODIUM CHLORIDE, PRESERVATIVE FREE 10 ML: 5 INJECTION INTRAVENOUS at 06:39

## 2022-07-09 RX ADMIN — LISINOPRIL 40 MG: 10 TABLET ORAL at 09:42

## 2022-07-09 RX ADMIN — HYDRALAZINE HYDROCHLORIDE 25 MG: 25 TABLET, FILM COATED ORAL at 17:04

## 2022-07-09 RX ADMIN — ASPIRIN 81 MG: 81 TABLET, COATED ORAL at 09:42

## 2022-07-09 RX ADMIN — DOCUSATE SODIUM 100 MG: 100 CAPSULE, LIQUID FILLED ORAL at 21:48

## 2022-07-09 RX ADMIN — SODIUM CHLORIDE, PRESERVATIVE FREE 10 ML: 5 INJECTION INTRAVENOUS at 17:07

## 2022-07-09 RX ADMIN — DOCUSATE SODIUM 100 MG: 100 CAPSULE, LIQUID FILLED ORAL at 09:42

## 2022-07-09 RX ADMIN — ISOSORBIDE MONONITRATE 30 MG: 30 TABLET, EXTENDED RELEASE ORAL at 09:42

## 2022-07-09 RX ADMIN — HYDROCHLOROTHIAZIDE 12.5 MG: 25 TABLET ORAL at 09:43

## 2022-07-09 RX ADMIN — HYDRALAZINE HYDROCHLORIDE 25 MG: 25 TABLET, FILM COATED ORAL at 21:48

## 2022-07-09 NOTE — PROGRESS NOTES
Patient has small bumps and cuts bilaterally on the upper extremities from \"cutting grass and work. \" Patient also has a rash on the inner right thigh.

## 2022-07-09 NOTE — PROGRESS NOTES
Hospitalist Progress Note               Daily Progress Note: 7/9/2022      Subjective:   Hospital course to date. Patient is a 78 y.o. male with history of coronary artery disease with stent placement, diabetes, hypertension, hyperlipidemia presented to the ED on 7/8 with complaint of jaw tightness and funny feeling in face. Patient reports having a heart attack about a year ago and was worried his symptoms might indicate another heart attack so he came into the ED. Wife states there have been episodes where his heart rate is low, he turns gray and not looking good. In the ED, he was speaking on the phone with his sister and suddenly grayed out almost as if he was going to pass out but recovered immediately. Blood pressure had been fluctuating but with no significant differences, no hypotension. Heart rate was in 50s and sometimes lower. The patient denied any dizziness, chest pain, shortness of breath, or palpitations. In the emergency room he experienced bradycardic episodes dropping into the low 30s many times. He reported feeling funny in the face and anxious about what is happening with him.    ____  The patient is seen for follow up. Patient is feeling tired from not sleeping well but no other complaints. Denies any symptoms of dizziness, chest pain, shortness of breath, palpitations. Patient reported within the past year and after his heart attack, he came into the hospital before with a similar funny feeling in face with a buzzing sound but the cause could not be determined. Patient reported he has experienced a similar episode of bradycardia within the past year. Patient is still bradycardic with a pulse of 45. Blood pressure is 145/82.      When he stood up as I was in the room heart rate improved to around 67    Problem List:  Problem List as of 7/9/2022 Date Reviewed: 7/8/2022          Codes Class Noted - Resolved    Dizziness ICD-10-CM: R42  ICD-9-CM: 780.4  7/8/2022 - Present        Headache ICD-10-CM: R51.9  ICD-9-CM: 784.0  2022 - Present        TIA (transient ischemic attack) ICD-10-CM: G45.9  ICD-9-CM: 435.9  2022 - Present        Bradycardia ICD-10-CM: R00.1  ICD-9-CM: 427.89  2021 - Present        Chest pain ICD-10-CM: R07.9  ICD-9-CM: 786.50  2021 - Present              Medications reviewed  Current Facility-Administered Medications   Medication Dose Route Frequency    hydrALAZINE (APRESOLINE) tablet 25 mg  25 mg Oral TID    isosorbide mononitrate ER (IMDUR) tablet 30 mg  30 mg Oral DAILY    clopidogreL (PLAVIX) tablet 75 mg  75 mg Oral DAILY    aspirin delayed-release tablet 81 mg  81 mg Oral DAILY    atorvastatin (LIPITOR) tablet 40 mg  40 mg Oral QPM    lisinopriL (PRINIVIL, ZESTRIL) tablet 40 mg  40 mg Oral DAILY    hydroCHLOROthiazide (HYDRODIURIL) tablet 12.5 mg  12.5 mg Oral DAILY    sodium chloride (NS) flush 5-40 mL  5-40 mL IntraVENous Q8H    sodium chloride (NS) flush 5-40 mL  5-40 mL IntraVENous PRN    acetaminophen (TYLENOL) tablet 650 mg  650 mg Oral Q6H PRN    docusate sodium (COLACE) capsule 100 mg  100 mg Oral BID       Review of Systems:   A comprehensive review of systems was negative except for that written in the HPI. Objective:   Physical Exam:     Visit Vitals  BP (!) 145/82 (BP 1 Location: Left upper arm, BP Patient Position: Semi fowlers)   Pulse (!) 45   Temp 97.5 °F (36.4 °C)   Resp 16   Ht 5' 10\" (1.778 m)   Wt 86.2 kg (190 lb)   SpO2 98%   BMI 27.26 kg/m²      O2 Device: None (Room air)    Temp (24hrs), Av.8 °F (36.6 °C), Min:97.5 °F (36.4 °C), Max:98.1 °F (36.7 °C)     0701 -  1900  In: -   Out: 500 [Urine:500]   No intake/output data recorded. General:   Awake and alert   Lungs:   Clear to auscultation bilaterally. Chest wall:  No tenderness or deformity. Heart:  Regular rate and rhythm, S1, S2 normal, no murmur, click, rub or gallop. Abdomen:   Soft, non-tender.  Bowel sounds normal. No masses,  No organomegaly. Extremities: Extremities normal, atraumatic, no cyanosis or edema. Pulses: 2+ and symmetric all extremities. Skin: Skin color, texture, turgor normal. No rashes or lesions   Neurologic: CNII-XII intact. No gross focal deficits         Data Review:       Recent Days:  Recent Labs     07/08/22 2116   WBC 9.3   HGB 14.6   HCT 41.8        Recent Labs     07/08/22 2116      K 3.3*   *   CO2 28   *   BUN 23*   CREA 1.35*   CA 8.7   ALB 4.0   TBILI 0.8   ALT 25     No results for input(s): PH, PCO2, PO2, HCO3, FIO2 in the last 72 hours. 24 Hour Results:  Recent Results (from the past 24 hour(s))   CBC WITH AUTOMATED DIFF    Collection Time: 07/08/22  9:16 PM   Result Value Ref Range    WBC 9.3 4.1 - 11.1 K/uL    RBC 4.85 4.10 - 5.70 M/uL    HGB 14.6 12.1 - 17.0 g/dL    HCT 41.8 36.6 - 50.3 %    MCV 86.2 80.0 - 99.0 FL    MCH 30.1 26.0 - 34.0 PG    MCHC 34.9 30.0 - 36.5 g/dL    RDW 13.2 11.5 - 14.5 %    PLATELET 794 973 - 798 K/uL    MPV 10.3 8.9 - 12.9 FL    NRBC 0.0 0.0  WBC    ABSOLUTE NRBC 0.00 0.00 - 0.01 K/uL    NEUTROPHILS 68 32 - 75 %    LYMPHOCYTES 22 12 - 49 %    MONOCYTES 5 5 - 13 %    EOSINOPHILS 4 0 - 7 %    BASOPHILS 1 0 - 1 %    IMMATURE GRANULOCYTES 0 0 - 0.5 %    ABS. NEUTROPHILS 6.2 1.8 - 8.0 K/UL    ABS. LYMPHOCYTES 2.1 0.8 - 3.5 K/UL    ABS. MONOCYTES 0.5 0.0 - 1.0 K/UL    ABS. EOSINOPHILS 0.4 0.0 - 0.4 K/UL    ABS. BASOPHILS 0.1 0.0 - 0.1 K/UL    ABS. IMM.  GRANS. 0.0 0.00 - 0.04 K/UL    DF AUTOMATED     METABOLIC PANEL, COMPREHENSIVE    Collection Time: 07/08/22  9:16 PM   Result Value Ref Range    Sodium 143 136 - 145 mmol/L    Potassium 3.3 (L) 3.5 - 5.1 mmol/L    Chloride 109 (H) 97 - 108 mmol/L    CO2 28 21 - 32 mmol/L    Anion gap 6 5 - 15 mmol/L    Glucose 163 (H) 65 - 100 mg/dL    BUN 23 (H) 6 - 20 mg/dL    Creatinine 1.35 (H) 0.70 - 1.30 mg/dL    BUN/Creatinine ratio 17 12 - 20      GFR est AA >60 >60 ml/min/1.73m2    GFR est non-AA 53 (L) >60 ml/min/1.73m2    Calcium 8.7 8.5 - 10.1 mg/dL    Bilirubin, total 0.8 0.2 - 1.0 mg/dL    AST (SGOT) 19 15 - 37 U/L    ALT (SGPT) 25 12 - 78 U/L    Alk. phosphatase 57 45 - 117 U/L    Protein, total 7.0 6.4 - 8.2 g/dL    Albumin 4.0 3.5 - 5.0 g/dL    Globulin 3.0 2.0 - 4.0 g/dL    A-G Ratio 1.3 1.1 - 2.2     TROPONIN-HIGH SENSITIVITY    Collection Time: 07/08/22  9:16 PM   Result Value Ref Range    Troponin-High Sensitivity 24 0 - 76 ng/L       XR CHEST PORT   Final Result   No evidence of acute cardiopulmonary process. Assessment:  Sinus bradycardia questionably symptomatic    Benign essential hypertension - has been fluctuating    Coronary artery disease with history of stent placement - stable, continue atorvastatin, aspirin, and plavix     Degenerative disc disease of cervical spine - has appointment with outpatient spine specialist    Diabetes mellitus type 2, diet controlled      Plan:  Admitted to telemetry  Waiting for Cardiology consultation  Close monitoring  Continue hydralazine 3x daily  Continue hydrochlorothiazide, isosorbide, lisinopril      CODE STATUS: Full      Care Plan discussed with: Patient/Family    Disposition:     Total time spent with patient: 30 minutes. Daniela Caballero MD  *ATTENTION:  This note has been created by a medical student for educational purposes only. Please do not refer to the content of this note for clinical decision-making, billing, or other purposes. Please see attending physicians note to obtain clinical information on this patient. *

## 2022-07-09 NOTE — PROGRESS NOTES
Problem: Falls - Risk of  Goal: *Absence of Falls  Description: Document Juany Escobar Fall Risk and appropriate interventions in the flowsheet.   Outcome: Progressing Towards Goal  Note: Fall Risk Interventions:            Medication Interventions: Assess postural VS orthostatic hypotension,Patient to call before getting OOB                   Problem: Patient Education: Go to Patient Education Activity  Goal: Patient/Family Education  Outcome: Progressing Towards Goal

## 2022-07-09 NOTE — PROGRESS NOTES
Hospitalist Progress Note               Daily Progress Note: 7/9/2022      Subjective:   Hospital course to date. Patient is a 78 y.o. male with history of coronary artery disease with stent placement, diabetes, hypertension, hyperlipidemia presented to the ED on 7/8 with complaint of jaw tightness and funny feeling in face. Patient reports having a heart attack about a year ago and was worried his symptoms might indicate another heart attack so he came into the ED. Wife states there have been episodes where his heart rate is low, he turns gray and not looking good. In the ED, he was speaking on the phone with his sister and suddenly grayed out almost as if he was going to pass out but recovered immediately. Blood pressure had been fluctuating but with no significant differences, no hypotension. Heart rate was in 50s and sometimes lower. The patient denied any dizziness, chest pain, shortness of breath, or palpitations. In the emergency room he experienced bradycardic episodes dropping into the low 30s many times. He reported feeling funny in the face and anxious about what is happening with him.    ____  The patient is seen for follow up. Patient is feeling tired from not sleeping well but no other complaints. Denies any symptoms of dizziness, chest pain, shortness of breath, palpitations. Patient reported within the past year and after his heart attack, he came into the hospital before with a similar funny feeling in face with a buzzing sound but the cause could not be determined. Patient reported he has experienced a similar episode of bradycardia within the past year. Patient is still bradycardic with a pulse of 45. Blood pressure is 145/82.      Problem List:  Problem List as of 7/9/2022 Date Reviewed: 7/8/2022          Codes Class Noted - Resolved    Dizziness ICD-10-CM: G26  ICD-9-CM: 780.4  7/8/2022 - Present        Headache ICD-10-CM: R51.9  ICD-9-CM: 784.0  4/21/2022 - Present        TIA (transient ischemic attack) ICD-10-CM: G45.9  ICD-9-CM: 435.9  2022 - Present        Bradycardia ICD-10-CM: R00.1  ICD-9-CM: 427.89  2021 - Present        Chest pain ICD-10-CM: R07.9  ICD-9-CM: 786.50  2021 - Present              Medications reviewed  Current Facility-Administered Medications   Medication Dose Route Frequency    hydrALAZINE (APRESOLINE) tablet 25 mg  25 mg Oral TID    isosorbide mononitrate ER (IMDUR) tablet 30 mg  30 mg Oral DAILY    clopidogreL (PLAVIX) tablet 75 mg  75 mg Oral DAILY    aspirin delayed-release tablet 81 mg  81 mg Oral DAILY    atorvastatin (LIPITOR) tablet 40 mg  40 mg Oral QPM    lisinopriL (PRINIVIL, ZESTRIL) tablet 40 mg  40 mg Oral DAILY    hydroCHLOROthiazide (HYDRODIURIL) tablet 12.5 mg  12.5 mg Oral DAILY    sodium chloride (NS) flush 5-40 mL  5-40 mL IntraVENous Q8H    sodium chloride (NS) flush 5-40 mL  5-40 mL IntraVENous PRN    acetaminophen (TYLENOL) tablet 650 mg  650 mg Oral Q6H PRN    docusate sodium (COLACE) capsule 100 mg  100 mg Oral BID       Review of Systems:   A comprehensive review of systems was negative except for that written in the HPI. Objective:   Physical Exam:     Visit Vitals  BP (!) 145/82 (BP 1 Location: Left upper arm, BP Patient Position: Semi fowlers)   Pulse (!) 45   Temp 97.5 °F (36.4 °C)   Resp 16   Ht 5' 10\" (1.778 m)   Wt 86.2 kg (190 lb)   SpO2 98%   BMI 27.26 kg/m²      O2 Device: None (Room air)    Temp (24hrs), Av.8 °F (36.6 °C), Min:97.5 °F (36.4 °C), Max:98.1 °F (36.7 °C)     07 -  1900  In: -   Out: 500 [Urine:500]   No intake/output data recorded. General:   Awake and alert   Lungs:   Clear to auscultation bilaterally. Chest wall:  No tenderness or deformity. Heart:  Regular rate and rhythm, S1, S2 normal, no murmur, click, rub or gallop. Abdomen:   Soft, non-tender. Bowel sounds normal. No masses,  No organomegaly.    Extremities: Extremities normal, atraumatic, no cyanosis or edema.   Pulses: 2+ and symmetric all extremities. Skin: Skin color, texture, turgor normal. No rashes or lesions   Neurologic: CNII-XII intact. No gross focal deficits         Data Review:       Recent Days:  Recent Labs     07/08/22 2116   WBC 9.3   HGB 14.6   HCT 41.8        Recent Labs     07/08/22 2116      K 3.3*   *   CO2 28   *   BUN 23*   CREA 1.35*   CA 8.7   ALB 4.0   TBILI 0.8   ALT 25     No results for input(s): PH, PCO2, PO2, HCO3, FIO2 in the last 72 hours. 24 Hour Results:  Recent Results (from the past 24 hour(s))   CBC WITH AUTOMATED DIFF    Collection Time: 07/08/22  9:16 PM   Result Value Ref Range    WBC 9.3 4.1 - 11.1 K/uL    RBC 4.85 4.10 - 5.70 M/uL    HGB 14.6 12.1 - 17.0 g/dL    HCT 41.8 36.6 - 50.3 %    MCV 86.2 80.0 - 99.0 FL    MCH 30.1 26.0 - 34.0 PG    MCHC 34.9 30.0 - 36.5 g/dL    RDW 13.2 11.5 - 14.5 %    PLATELET 837 838 - 807 K/uL    MPV 10.3 8.9 - 12.9 FL    NRBC 0.0 0.0  WBC    ABSOLUTE NRBC 0.00 0.00 - 0.01 K/uL    NEUTROPHILS 68 32 - 75 %    LYMPHOCYTES 22 12 - 49 %    MONOCYTES 5 5 - 13 %    EOSINOPHILS 4 0 - 7 %    BASOPHILS 1 0 - 1 %    IMMATURE GRANULOCYTES 0 0 - 0.5 %    ABS. NEUTROPHILS 6.2 1.8 - 8.0 K/UL    ABS. LYMPHOCYTES 2.1 0.8 - 3.5 K/UL    ABS. MONOCYTES 0.5 0.0 - 1.0 K/UL    ABS. EOSINOPHILS 0.4 0.0 - 0.4 K/UL    ABS. BASOPHILS 0.1 0.0 - 0.1 K/UL    ABS. IMM.  GRANS. 0.0 0.00 - 0.04 K/UL    DF AUTOMATED     METABOLIC PANEL, COMPREHENSIVE    Collection Time: 07/08/22  9:16 PM   Result Value Ref Range    Sodium 143 136 - 145 mmol/L    Potassium 3.3 (L) 3.5 - 5.1 mmol/L    Chloride 109 (H) 97 - 108 mmol/L    CO2 28 21 - 32 mmol/L    Anion gap 6 5 - 15 mmol/L    Glucose 163 (H) 65 - 100 mg/dL    BUN 23 (H) 6 - 20 mg/dL    Creatinine 1.35 (H) 0.70 - 1.30 mg/dL    BUN/Creatinine ratio 17 12 - 20      GFR est AA >60 >60 ml/min/1.73m2    GFR est non-AA 53 (L) >60 ml/min/1.73m2    Calcium 8.7 8.5 - 10.1 mg/dL    Bilirubin, total 0.8 0.2 - 1.0 mg/dL    AST (SGOT) 19 15 - 37 U/L    ALT (SGPT) 25 12 - 78 U/L    Alk. phosphatase 57 45 - 117 U/L    Protein, total 7.0 6.4 - 8.2 g/dL    Albumin 4.0 3.5 - 5.0 g/dL    Globulin 3.0 2.0 - 4.0 g/dL    A-G Ratio 1.3 1.1 - 2.2     TROPONIN-HIGH SENSITIVITY    Collection Time: 07/08/22  9:16 PM   Result Value Ref Range    Troponin-High Sensitivity 24 0 - 76 ng/L       XR CHEST PORT   Final Result   No evidence of acute cardiopulmonary process. Assessment:  Sinus bradycardia     Benign essential hypertension - has been fluctuating    Coronary artery disease with history of stent placement - stable, continue atorvastatin, aspirin, and plavix     Degenerative disc disease of cervical spine - has appointment with outpatient spine specialist      Plan:  Admitted to telemetry  Waiting for Cardiology consultation  Close monitoring  Continue hydralazine 3x daily  Continue hydrochlorothiazide, isosorbide, lisinopril      CODE STATUS: Full      Care Plan discussed with: Patient/Family    Disposition:     Total time spent with patient: 30 minutes. Danielle Osman  *ATTENTION:  This note has been created by a medical student for educational purposes only. Please do not refer to the content of this note for clinical decision-making, billing, or other purposes. Please see attending physicians note to obtain clinical information on this patient. *

## 2022-07-09 NOTE — PROGRESS NOTES
Reason for Admission:  Bradycardia                     RUR Score:    9%                 Plan for utilizing home health:  Not at this time        PCP: First and Last name:  Mildred Blanco MD     Name of Practice:    Are you a current patient: Yes/No:    Approximate date of last visit: Last week   Can you participate in a virtual visit with your PCP:                     Current Advanced Directive/Advance Care Plan: Full Code      Healthcare Decision Maker:   Click here to complete [id number] including selection of the Healthcare Decision Maker Relationship (ie \"Primary\")       Emy De Santiago, wife, [phone]                      Transition of Care Plan:      Met f/f with Pt, he confirmed that the information on the face sheet is correct. Pt stated that he live with his wife. Pt stated no HH, no DME and independent with ADL. Pt stated that he uses [employer]. Pt stated that family will give him a ride home when he is D/C.      CM dispo: Home with no needs at this time

## 2022-07-09 NOTE — ED TRIAGE NOTES
Pt states his BP has been going up and down d/t PCP making adjustments to hydralazine. States when his BP is high he's dizzy and when it's low his color changes per wife. Pt ambulatory, color WNL for ethnicity, appears in NAD.  H/o CAD with stenting

## 2022-07-09 NOTE — PROGRESS NOTES
Orthostatic blood pressures are as follows:    Lying 172/79 heart rate 50  Sitting 160/71 heart rate 48  Standing 164/76 heart rate 52

## 2022-07-09 NOTE — H&P
History and Physical              Subjective :   Chief Complaint : Dizziness, near syncope    Source of information : Patient, spouse at bedside. Reliable historians. History of present illness:   78 y.o. male with history of coronary artery disease with stent placement, diabetes, hypertension presents to the emergency room complaining of an episode of dizziness as if he is going to pass out. Wife states there is episodes that she can relates that his heart rate is low, he turns gray and not looking good, today while he was speaking on the phone with his sister suddenly he grayed out almost he is going to pass out. And recovered immediately. They are checking the blood pressure frequently, it is fluctuating but not with significant differences, no hypotension. Heart rate is in 50s but she noticed that sometimes it is dropping below. No exacerbating relieving factors, denies any chest pain or shortness of breath associated with it. In the emergency room noticed to have bradycardic episodes dropping into the low 30s many times. Patient denies any chest pain, palpitations. He just feels funny in the face, anxious about what is happening with him.     Past Medical History:   Diagnosis Date    Arthritis     CAD (coronary artery disease)     patient stated he has 2 stents     Diabetes ([facility] [address]     Patient stated diet controlled monitors sugar daily not currently on medications    GERD (gastroesophageal reflux disease)     Hyperlipidemia     Hypertension     Ill-defined condition     patient stated approx 2 weeks ago he had a knot in his left leg after wearing tight socks went to his PCP was told it was a blood clot and was told to apply heat he stated it has gotten better stated small knot remains pain had ceased as well as swelling    Sleep apnea     Patient stated he had a CPAP in the past no longer has to use      Past Surgical History:   Procedure Laterality Date    HX COLONOSCOPY      HX ORTHOPAEDIC pt states he's had 4 back surgeries    LA ABDOMEN SURGERY PROC UNLISTED      hernia repair times 3    LA CARDIAC SURG PROCEDURE UNLIST      cardiac cath 2020     Family History   Problem Relation Age of Onset    Heart Disease Mother     Heart Disease Father       Social History     Tobacco Use    Smoking status: Former Smoker     Packs/day: 0.50    Smokeless tobacco: Never Used   Substance Use Topics    Alcohol use: Not Currently     Comment: Used to only drink on special occassions       Prior to Admission medications    Medication Sig Start Date End Date Taking? Authorizing Provider   isosorbide mononitrate ER (IMDUR) 60 mg CR tablet Take 60 mg by mouth daily. 6/6/22   Provider, Historical   hydrALAZINE (APRESOLINE) 50 mg tablet Take 25 mg by mouth three (3) times daily. 6/9/22   Provider, Historical   hydroCHLOROthiazide (HYDRODIURIL) 25 mg tablet Take 12.5 mg by mouth daily. 4/4/22   Provider, Historical   lisinopriL (PRINIVIL, ZESTRIL) 40 mg tablet Take 1 Tablet by mouth daily. 12/17/21   Chacorta Briones MD   atorvastatin (LIPITOR) 40 mg tablet Take 1 Tablet by mouth every evening. 12/2/21   Provider, Historical   clopidogreL (Plavix) 75 mg tab Take 75 mg by mouth daily. Provider, Historical   aspirin delayed-release 81 mg tablet Take 81 mg by mouth daily. Provider, Historical     Allergies   Allergen Reactions    Penicillins Other (comments)     Patient stated as a child had convulsions, nausea and vomiting              Review of Systems:  Constitutional: Appetite is good, denies weight loss, no fever, no chills, no night sweats. Eye: No recent visual disturbances, no discharge, no double vision. Ear/nose/mouth/throat : No hearing disturbance, no ear pain, no nasal congestion, no sore throat, no trouble swallowing. Respiratory : No trouble breathing, no cough,  no hemoptysis, no wheezing. Cardiovascular : As in history of present illness. No peripheral edema.   Gastrointestinal : No nausea, no vomiting, no diarrhea,No abdominal pain. Genitourinary : No dysuria, no hematuria, no increased frequency, No incontinence. Endocrine : No excessive thirst, No polyuria   Immunologic : , No seasonal allergies. Musculoskeletal : No joint swelling, No pain, No effusion. Has problems with the spine, history of lower back surgery, has significant degenerative disc disease of the cervical spine with symptoms. Integumentary : No rash, No pruritus, No ecchymosis. Hematology : No petechiae, No easy bruising,  No tendency to bleed easy. Neurology : Denies change in mental status,  No confusion, No numbness or tingling. Psychiatric : No mood swings, No anxiety, No depression. Vitals:     Patient Vitals for the past 12 hrs:   Temp Pulse Resp BP SpO2   07/08/22 2300 -- (!) 47 10 (!) 154/80 98 %   07/08/22 2200 -- (!) 51 14 (!) 157/63 98 %   07/08/22 2043 98.1 °F (36.7 °C) (!) 57 16 (!) 165/75 96 %       Physical Exam:   General : Looks comfortable, no respiratory distress noted. HEENT : PERRLA, normal oral mucosa, atraumatic normocephalic, Normal ear and nose. Neck : Supple, no JVD, no masses noted, no carotid bruit. Lungs : Breath sounds with good air entry bilaterally, no wheezes or rales, no accessory muscle use. CVS : Rhythm rate regular, monitor showing junctional rhythm with fluctuating heart rate from 30-60. Zaira Coley Has soft systolic murmur right parasternal border. Abdomen : Soft, nontender, no organomegaly, bowel sounds active. Extremities : No edema noted,  pedal pulses not palpable. Musculoskeletal : Fair range of motion, no joint swelling or effusion, muscle tone and power appears fair. Skin : Moist, warm, skin turgor, no pathological rash. Lymphatic : No cervical lymphadenopathy. Neurological : Awake, alert, oriented to time place person. Psychiatric : Mood and affect appears appropriate to the situation.          Data Review:   Recent Results (from the past 24 hour(s))   CBC WITH AUTOMATED DIFF    Collection Time: 07/08/22  9:16 PM   Result Value Ref Range    WBC 9.3 4.1 - 11.1 K/uL    RBC 4.85 4.10 - 5.70 M/uL    HGB 14.6 12.1 - 17.0 g/dL    HCT 41.8 36.6 - 50.3 %    MCV 86.2 80.0 - 99.0 FL    MCH 30.1 26.0 - 34.0 PG    MCHC 34.9 30.0 - 36.5 g/dL    RDW 13.2 11.5 - 14.5 %    PLATELET 094 043 - 152 K/uL    MPV 10.3 8.9 - 12.9 FL    NRBC 0.0 0.0  WBC    ABSOLUTE NRBC 0.00 0.00 - 0.01 K/uL    NEUTROPHILS 68 32 - 75 %    LYMPHOCYTES 22 12 - 49 %    MONOCYTES 5 5 - 13 %    EOSINOPHILS 4 0 - 7 %    BASOPHILS 1 0 - 1 %    IMMATURE GRANULOCYTES 0 0 - 0.5 %    ABS. NEUTROPHILS 6.2 1.8 - 8.0 K/UL    ABS. LYMPHOCYTES 2.1 0.8 - 3.5 K/UL    ABS. MONOCYTES 0.5 0.0 - 1.0 K/UL    ABS. EOSINOPHILS 0.4 0.0 - 0.4 K/UL    ABS. BASOPHILS 0.1 0.0 - 0.1 K/UL    ABS. IMM. GRANS. 0.0 0.00 - 0.04 K/UL    DF AUTOMATED     METABOLIC PANEL, COMPREHENSIVE    Collection Time: 07/08/22  9:16 PM   Result Value Ref Range    Sodium 143 136 - 145 mmol/L    Potassium 3.3 (L) 3.5 - 5.1 mmol/L    Chloride 109 (H) 97 - 108 mmol/L    CO2 28 21 - 32 mmol/L    Anion gap 6 5 - 15 mmol/L    Glucose 163 (H) 65 - 100 mg/dL    BUN 23 (H) 6 - 20 mg/dL    Creatinine 1.35 (H) 0.70 - 1.30 mg/dL    BUN/Creatinine ratio 17 12 - 20      GFR est AA >60 >60 ml/min/1.73m2    GFR est non-AA 53 (L) >60 ml/min/1.73m2    Calcium 8.7 8.5 - 10.1 mg/dL    Bilirubin, total 0.8 0.2 - 1.0 mg/dL    AST (SGOT) 19 15 - 37 U/L    ALT (SGPT) 25 12 - 78 U/L    Alk. phosphatase 57 45 - 117 U/L    Protein, total 7.0 6.4 - 8.2 g/dL    Albumin 4.0 3.5 - 5.0 g/dL    Globulin 3.0 2.0 - 4.0 g/dL    A-G Ratio 1.3 1.1 - 2.2     TROPONIN-HIGH SENSITIVITY    Collection Time: 07/08/22  9:16 PM   Result Value Ref Range    Troponin-High Sensitivity 24 0 - 76 ng/L       Radiologic Studies :     CXR Results  (Last 48 hours)               07/08/22 2059  XR CHEST PORT Final result    Impression:  No evidence of acute cardiopulmonary process.            Narrative: INDICATION:  chest pain        COMPARISON: April 20       FINDINGS: Single AP portable view of the chest obtained at 2056 demonstrates a   stable cardiomediastinal silhouette. The lungs are clear bilaterally. No osseous   abnormalities are seen. Assessment and Plan :     Dizziness with syncope: Looks like secondary to bradycardia. Need close monitoring, admitted to telemetry unit. Consultation placed to cardiology and will follow with recommendations. Benign essential hypertension: Fluctuating. We will change hydralazine to 3 times a day to avoid any rebound hypertension with the twice a day. He already had a reduced dose by his PCP to 25 mg which we will continue. We will continue hydrochlorothiazide, isosorbide, lisinopril. Coronary artery disease with history of stent placement: Stable, no evidence of decompensation. On atorvastatin, aspirin and Plavix which we will continue. Degenerative disc disease cervical spine, has an appointment outpatient with spine specialist, no further interventions needed at this time. Admitted to cardiac telemetry, full CODE STATUS, home medications reviewed and documented. Has no advance medical directives. CC : Jessica Marcum MD  Signed By: Juanito Lu MD     July 8, 2022      This dictation was done by dragon, computer voice recognition software. Often unanticipated grammatical, syntax, [place] phones and other interpretive errors are inadvertently transcribed. Please excuse errors that have escaped final proofreading.

## 2022-07-09 NOTE — CONSULTS
Consult    NAME: Nadine Rehman   :  1955   MRN:  [id number]     Date/Time:  2022 6:03 AM    Patient PCP: Harl Essex, MD   ________________________________________________________________________    Assessment:   Cardiology cross cover consultation-[facility]  Tesha Spivey MD)    PROBLEM LIST:  1. Near syncope  2. Bradycardia  3. Previous myocardial infarction  4. Coronary artery disease (2021)       4a. Left main with mild luminal irregularities       4b. LAD proximal previous PCI (30% in-stent stenosis)         4b. i.  Mid LAD 80% stenosis with previous PCI       4c. Left circumflex angiographically normal        4c.i. First obtuse marginal 80% stenosis, suspect . Healed chronic dissection      4d. Right coronary artery angiographically normal  5. Grade I (mild) diastolic dysfunction, or impaired relaxation  6. Hypertension  7. Dyslipidemia  8. Type 2 diabetes  9. Former tobacco use  10. Obstructive sleep apnea (noncompliant with CPAP)    11. Chronic kidney disease  12. Gastroesophageal reflux disease (GERD)  13. Arthritis  14. Electrolyte abnormalities (hypokalemia, and hyperchloremia)        []        High complexity decision making was performed        Subjective:   CHIEF COMPLAINT:     HISTORY OF PRESENT ILLNESS:     This 14-year-old male with known coronary disease presents for evaluation of near syncope. The patient gives a history of previous myocardial infarction, and subsequent percutaneous coronary intervention (PCI). There is no history of congestive heart failure. He does have a history of bradycardia. In the past it has been exacerbated by beta-blocker therapy. He describes evaluation for bradycardia including an outpatient 2-week Holter monitor. He is advised to avoid all beta-blockers, and use therapy including CPAP as instructed. He denies any chest pain, pressure or tightness.   Further there is no shortness of breath or dyspnea on exertion. He does describe a [place] was concerning for cardiac abnormalities. He therefore presented to the emergency department. In the emergency department he is treated according to the \"chest pain protocol\". His twelve-lead EKG showed no evidence of acute infarction or ischemia. His cardiac enzymes were unremarkable. Once admitted patient was noted to be bradycardic. Cardiology was therefore consulted to assist in evaluation and management.         Past Medical History:   Diagnosis Date    Arthritis     CAD (coronary artery disease)     patient stated he has 2 stents     Diabetes ([address]     Patient stated diet controlled monitors sugar daily not currently on medications    GERD (gastroesophageal reflux disease)     Hyperlipidemia     Hypertension     Ill-defined condition     patient stated approx 2 weeks ago he had a knot in his left leg after wearing tight socks went to his PCP was told it was a blood clot and was told to apply heat he stated it has gotten better stated small knot remains pain had ceased as well as swelling    Sleep apnea     Patient stated he had a CPAP in the past no longer has to use       Past Surgical History:   Procedure Laterality Date    HX COLONOSCOPY      HX ORTHOPAEDIC      pt states he's had 4 back surgeries    ND ABDOMEN SURGERY PROC UNLISTED      hernia repair times 3    ND CARDIAC SURG PROCEDURE UNLIST      cardiac cath 2020     Allergies   Allergen Reactions    Penicillins Other (comments)     Patient stated as a child had convulsions, nausea and vomiting       Meds:  See below  Social History     Tobacco Use    Smoking status: Former Smoker     Packs/day: 0.50    Smokeless tobacco: Never Used   Substance Use Topics    Alcohol use: Not Currently     Comment: Used to only drink on special occassions      Family History   Problem Relation Age of Onset    Heart Disease Mother     Heart Disease Father        REVIEW OF SYSTEMS: Above, otherwise noncontributory. Objective:      Physical Exam:    Last 24hrs VS reviewed since prior progress note. Most recent are:    Visit Vitals  BP (!) 165/77   Pulse (!) 43   Temp 98.1 °F (36.7 °C)   Resp 16   Ht 5' 10\" (1.778 m)   Wt 86.2 kg (190 lb)   SpO2 98%   BMI 27.26 kg/m²     No intake or output data in the 24 hours ending 07/09/22 0603     General Appearance: Well developed, no acute respiratory distress. Ears/Nose/Mouth/Throat: Atraumatic, normocephalic, PERRL. Neck: Supple. JVP within normal limits. Chest: Lungs clear to auscultation bilaterally. Cardiovascular: JVP is not elevated, PMI is not palpable, normal intensity S1 and S2, without S3. Abdomen: Soft, non-tender,non-distended, bowel sounds present. Extremities: No edema bilaterally. .  Skin: Warm and dry. Neuro: CN II-XII grossly intact, without gross motor/sensory deficit. Data:      Telemetry:    EKG:  []  No new EKG for review  XR CHEST PORT   Final Result   No evidence of acute cardiopulmonary process. Prior to Admission medications    Medication Sig Start Date End Date Taking? Authorizing Provider   isosorbide mononitrate ER (IMDUR) 60 mg CR tablet Take 60 mg by mouth daily. 6/6/22  Yes Provider, Historical   hydrALAZINE (APRESOLINE) 50 mg tablet Take 25 mg by mouth three (3) times daily. 6/9/22  Yes Provider, Historical   hydroCHLOROthiazide (HYDRODIURIL) 25 mg tablet Take 12.5 mg by mouth daily. 4/4/22  Yes Provider, Historical   lisinopriL (PRINIVIL, ZESTRIL) 40 mg tablet Take 1 Tablet by mouth daily. 12/17/21  Yes Choco Alves MD   atorvastatin (LIPITOR) 40 mg tablet Take 1 Tablet by mouth every evening. 12/2/21  Yes Provider, Historical   clopidogreL (Plavix) 75 mg tab Take 75 mg by mouth daily. Yes Provider, Historical   aspirin delayed-release 81 mg tablet Take 81 mg by mouth daily.    Yes Provider, Historical       Recent Results (from the past 24 hour(s))   CBC WITH AUTOMATED DIFF    Collection Time: 07/08/22  9:16 PM   Result Value Ref Range    WBC 9.3 4.1 - 11.1 K/uL    RBC 4.85 4.10 - 5.70 M/uL    HGB 14.6 12.1 - 17.0 g/dL    HCT 41.8 36.6 - 50.3 %    MCV 86.2 80.0 - 99.0 FL    MCH 30.1 26.0 - 34.0 PG    MCHC 34.9 30.0 - 36.5 g/dL    RDW 13.2 11.5 - 14.5 %    PLATELET 165 898 - 503 K/uL    MPV 10.3 8.9 - 12.9 FL    NRBC 0.0 0.0  WBC    ABSOLUTE NRBC 0.00 0.00 - 0.01 K/uL    NEUTROPHILS 68 32 - 75 %    LYMPHOCYTES 22 12 - 49 %    MONOCYTES 5 5 - 13 %    EOSINOPHILS 4 0 - 7 %    BASOPHILS 1 0 - 1 %    IMMATURE GRANULOCYTES 0 0 - 0.5 %    ABS. NEUTROPHILS 6.2 1.8 - 8.0 K/UL    ABS. LYMPHOCYTES 2.1 0.8 - 3.5 K/UL    ABS. MONOCYTES 0.5 0.0 - 1.0 K/UL    ABS. EOSINOPHILS 0.4 0.0 - 0.4 K/UL    ABS. BASOPHILS 0.1 0.0 - 0.1 K/UL    ABS. IMM. GRANS. 0.0 0.00 - 0.04 K/UL    DF AUTOMATED     METABOLIC PANEL, COMPREHENSIVE    Collection Time: 07/08/22  9:16 PM   Result Value Ref Range    Sodium 143 136 - 145 mmol/L    Potassium 3.3 (L) 3.5 - 5.1 mmol/L    Chloride 109 (H) 97 - 108 mmol/L    CO2 28 21 - 32 mmol/L    Anion gap 6 5 - 15 mmol/L    Glucose 163 (H) 65 - 100 mg/dL    BUN 23 (H) 6 - 20 mg/dL    Creatinine 1.35 (H) 0.70 - 1.30 mg/dL    BUN/Creatinine ratio 17 12 - 20      GFR est AA >60 >60 ml/min/1.73m2    GFR est non-AA 53 (L) >60 ml/min/1.73m2    Calcium 8.7 8.5 - 10.1 mg/dL    Bilirubin, total 0.8 0.2 - 1.0 mg/dL    AST (SGOT) 19 15 - 37 U/L    ALT (SGPT) 25 12 - 78 U/L    Alk. phosphatase 57 45 - 117 U/L    Protein, total 7.0 6.4 - 8.2 g/dL    Albumin 4.0 3.5 - 5.0 g/dL    Globulin 3.0 2.0 - 4.0 g/dL    A-G Ratio 1.3 1.1 - 2.2     TROPONIN-HIGH SENSITIVITY    Collection Time: 07/08/22  9:16 PM   Result Value Ref Range    Troponin-High Sensitivity 24 0 - 76 ng/L          Plan:   1. Continue telemetry monitor while hospitalized  2. Conclude serial cardiac enzymes  3. Continue to monitor serum electrolytes, renal function  4. Avoid all agents acting through the atrioventricular node  5. Continue current cardiovascular medications including spine, atorvastatin, clopidogrel, hydralazine, hydrochlorothiazide, isosorbide mononitrate, and lisinopril  3.    Shahid Marcum MD

## 2022-07-09 NOTE — ED PROVIDER NOTES
[facility]  EMERGENCY DEPARTMENT ENCOUNTER NOTE    Date: 7/8/2022  Patient Name: Prema Gilbert      History of Presenting Illness     Chief Complaint   Patient presents with    Dizziness       History Provided By: Patient    HPI: Prema Gilbert, 78 y.o. male with PMH of HTN, HLD, GERD, DM and CAD presenting to the ED with sensation of being off and intermittent episodes of dizziness. No chest pain or shortness of breath. Patient feels that he is slowed the moment. Is been dealing with his blood pressure recently and was started on hydralazine. Due to his dizziness, his primary care doctor cut his dose of hydralazine to half. He is denying any chest pain, shortness of breath, nausea, or vomiting. Symptoms of been going for several days. Moderate severity, no known trigger, aggravating leaving factors with no association symptoms. There are no other complaints, changes, or physical findings at this time.     PCP: Loi Rosenberg MD    Current Facility-Administered Medications   Medication Dose Route Frequency Provider Last Rate Last Admin    hydrALAZINE (APRESOLINE) tablet 25 mg  25 mg Oral TID Alexandre Velarde MD        isosorbide mononitrate ER (IMDUR) tablet 30 mg  30 mg Oral DAILY Alexandre Velarde MD        clopidogreL (PLAVIX) tablet 75 mg  75 mg Oral DAILY Alexandre Velarde MD        aspirin delayed-release tablet 81 mg  81 mg Oral DAILY Alexandre Velarde MD        atorvastatin (LIPITOR) tablet 40 mg  40 mg Oral QPM Alexandre Velarde MD        lisinopriL (PRINIVIL, ZESTRIL) tablet 40 mg  40 mg Oral DAILY Alexandre Velarde MD        hydroCHLOROthiazide (HYDRODIURIL) tablet 12.5 mg  12.5 mg Oral DAILY Alexandre Velarde MD        sodium chloride (NS) flush 5-40 mL  5-40 mL IntraVENous Q8H Alexandre Velarde MD   10 mL at 07/09/22 0217    sodium chloride (NS) flush 5-40 mL  5-40 mL IntraVENous PRN Kondragunta, Gustavo Beckham MD        acetaminophen (TYLENOL) tablet 650 mg  650 mg Oral Q6H PRN Marija Hernandez MD        docusate sodium (COLACE) capsule 100 mg  100 mg Oral BID Marija Hernandez MD           Past History     Past Medical History:  Past Medical History:   Diagnosis Date    Arthritis     CAD (coronary artery disease)     patient stated he has 2 stents     Diabetes ([address]     Patient stated diet controlled monitors sugar daily not currently on medications    GERD (gastroesophageal reflux disease)     Hyperlipidemia     Hypertension     Ill-defined condition     patient stated approx 2 weeks ago he had a knot in his left leg after wearing tight socks went to his PCP was told it was a blood clot and was told to apply heat he stated it has gotten better stated small knot remains pain had ceased as well as swelling    Sleep apnea     Patient stated he had a CPAP in the past no longer has to use        Past Surgical History:  Past Surgical History:   Procedure Laterality Date    HX COLONOSCOPY      HX ORTHOPAEDIC      pt states he's had 4 back surgeries    TX ABDOMEN SURGERY PROC UNLISTED      hernia repair times 3    TX CARDIAC SURG PROCEDURE UNLIST      cardiac cath 2020       Family History:  Family History   Problem Relation Age of Onset    Heart Disease Mother     Heart Disease Father        Social History:  Social History     Tobacco Use    Smoking status: Former Smoker     Packs/day: 0.50    Smokeless tobacco: Never Used   Vaping Use    Vaping Use: Never used   Substance Use Topics    Alcohol use: Not Currently     Comment: Used to only drink on special occassions    Drug use: Never       Allergies:   Allergies   Allergen Reactions    Penicillins Other (comments)     Patient stated as a child had convulsions, nausea and vomiting          Review of Systems     Review of Systems    A 10 point review of system was performed and was negative except as noted above in HPI    Physical Exam Physical Exam  Vitals and nursing note reviewed. Constitutional:       General: He is not in acute distress. Appearance: He is not ill-appearing, toxic-appearing or diaphoretic. HENT:      Head: Normocephalic and atraumatic. Cardiovascular:      Rate and Rhythm: Regular rhythm. Bradycardia present. Heart sounds: Normal heart sounds. Pulmonary:      Effort: Pulmonary effort is normal.      Breath sounds: Normal breath sounds. Abdominal:      Palpations: Abdomen is soft. Tenderness: There is no abdominal tenderness. Musculoskeletal:      Cervical back: Normal range of motion and neck supple. Right lower leg: No tenderness. No edema. Left lower leg: No tenderness. No edema. Skin:     General: Skin is warm and dry. Neurological:      Mental Status: He is alert and oriented to person, place, and time. Lab and Diagnostic Study Results     Labs -     Recent Results (from the past 12 hour(s))   CBC WITH AUTOMATED DIFF    Collection Time: 07/08/22  9:16 PM   Result Value Ref Range    WBC 9.3 4.1 - 11.1 K/uL    RBC 4.85 4.10 - 5.70 M/uL    HGB 14.6 12.1 - 17.0 g/dL    HCT 41.8 36.6 - 50.3 %    MCV 86.2 80.0 - 99.0 FL    MCH 30.1 26.0 - 34.0 PG    MCHC 34.9 30.0 - 36.5 g/dL    RDW 13.2 11.5 - 14.5 %    PLATELET 220 607 - 404 K/uL    MPV 10.3 8.9 - 12.9 FL    NRBC 0.0 0.0  WBC    ABSOLUTE NRBC 0.00 0.00 - 0.01 K/uL    NEUTROPHILS 68 32 - 75 %    LYMPHOCYTES 22 12 - 49 %    MONOCYTES 5 5 - 13 %    EOSINOPHILS 4 0 - 7 %    BASOPHILS 1 0 - 1 %    IMMATURE GRANULOCYTES 0 0 - 0.5 %    ABS. NEUTROPHILS 6.2 1.8 - 8.0 K/UL    ABS. LYMPHOCYTES 2.1 0.8 - 3.5 K/UL    ABS. MONOCYTES 0.5 0.0 - 1.0 K/UL    ABS. EOSINOPHILS 0.4 0.0 - 0.4 K/UL    ABS. BASOPHILS 0.1 0.0 - 0.1 K/UL    ABS. IMM.  GRANS. 0.0 0.00 - 0.04 K/UL    DF AUTOMATED     METABOLIC PANEL, COMPREHENSIVE    Collection Time: 07/08/22  9:16 PM   Result Value Ref Range    Sodium 143 136 - 145 mmol/L    Potassium 3.3 (L) 3.5 - 5.1 mmol/L    Chloride 109 (H) 97 - 108 mmol/L    CO2 28 21 - 32 mmol/L    Anion gap 6 5 - 15 mmol/L    Glucose 163 (H) 65 - 100 mg/dL    BUN 23 (H) 6 - 20 mg/dL    Creatinine 1.35 (H) 0.70 - 1.30 mg/dL    BUN/Creatinine ratio 17 12 - 20      GFR est AA >60 >60 ml/min/1.73m2    GFR est non-AA 53 (L) >60 ml/min/1.73m2    Calcium 8.7 8.5 - 10.1 mg/dL    Bilirubin, total 0.8 0.2 - 1.0 mg/dL    AST (SGOT) 19 15 - 37 U/L    ALT (SGPT) 25 12 - 78 U/L    Alk. phosphatase 57 45 - 117 U/L    Protein, total 7.0 6.4 - 8.2 g/dL    Albumin 4.0 3.5 - 5.0 g/dL    Globulin 3.0 2.0 - 4.0 g/dL    A-G Ratio 1.3 1.1 - 2.2     TROPONIN-HIGH SENSITIVITY    Collection Time: 07/08/22  9:16 PM   Result Value Ref Range    Troponin-High Sensitivity 24 0 - 76 ng/L       Radiologic Studies -   [unfilled]  CT Results  (Last 48 hours)    None        CXR Results  (Last 48 hours)               07/08/22 2059  XR CHEST PORT Final result    Impression:  No evidence of acute cardiopulmonary process. Narrative:  INDICATION:  chest pain        COMPARISON: April 20       FINDINGS: Single AP portable view of the chest obtained at 2056 demonstrates a   stable cardiomediastinal silhouette. The lungs are clear bilaterally. No osseous   abnormalities are seen. Medical Decision Making and ED Course   - I am the first and primary provider for this patient AND AM THE PRIMARY PROVIDER OF RECORD. - I reviewed the vital signs, available nursing notes, past medical history, past surgical history, family history and social history. - Initial assessment performed. The patients presenting problems have been discussed, and the staff are in agreement with the care plan formulated and outlined with them. I have encouraged them to ask questions as they arise throughout their visit. Vital Signs-Reviewed the patient's vital signs.     Patient Vitals for the past 24 hrs:   Temp Pulse Resp BP SpO2   07/09/22 0000 -- (!) 43 16 (!) 165/77 98 %   07/08/22 2300 -- (!) 47 10 (!) 154/80 98 %   07/08/22 2200 -- (!) 51 14 (!) 157/63 98 %   07/08/22 2043 98.1 °F (36.7 °C) (!) 57 16 (!) 165/75 96 %       Records Reviewed: Nursing Notes    Provider Notes (Medical Decision Making):       ED Course as of 07/09/22 0423   Fri Jul 08, 2022 2120 Patient is presenting with symptomatic bradycardia. He has been reporting some sort of exertional dizziness. He is hypertensive but his heart rate dipped down to the 30s while I was talking to him. I am concerned this is the reason that he has been having these episodes. We will get labs to see if there is any correctable electrolyte derangement otherwise patient is likely to be admitted to the hospital. [AA]   2130 EKG was done at 8:40 PM and interpreted by me as sinus pericardia rate of 53 with first-degree AV block, WI is prolonged, QRS and QTc within normal, left axis deviation, no ST elevation or depression, no signs of dysrhythmia. [AA]   2300 Work-up in the ED is unrevealing. Patient continues to be very bradycardic. We will admit the patient to the hospital. [AA]      ED Course User Index  [AA] Aydin Ferrara MD         Diagnosis     Clinical Impression:   1. Dizziness    2. Bradycardia          Disposition     Disposition: Condition stable      Admitted      Attestations: Thea Chavez MD    Please note that this dictation was completed with GetNotes, the computer voice recognition software. Quite often unanticipated grammatical, syntax, homophones, and other interpretive errors are inadvertently transcribed by the computer software. Please disregard these errors. Please excuse any errors that have escaped final proofreading. Thank you.

## 2022-07-09 NOTE — ROUTINE PROCESS
TRANSFER - OUT REPORT:    Written report given to Robb Colon RN on Tomer Cole  being transferred to [facility] [phone] for routine progression of care       Report consisted of patients Situation, Background, Assessment and   Recommendations(SBAR). Information from the following report(s) SBAR and ED Summary was reviewed with the receiving nurse. Lines:   Peripheral IV 07/08/22 Right Forearm (Active)   Site Assessment Clean, dry, & intact 07/08/22 2110   Dressing Status Clean, dry, & intact 07/08/22 2110   Dressing Type Transparent;Tape 07/08/22 2110   Hub Color/Line Status Pink;Patent; Flushed 07/08/22 2110   Action Taken Blood drawn 07/08/22 2110        Opportunity for questions and clarification was provided.       Patient transported with:   Monitor  Tech

## 2022-07-10 VITALS
OXYGEN SATURATION: 97 % | BODY MASS INDEX: 27.2 KG/M2 | HEIGHT: 70 IN | TEMPERATURE: 97.4 F | HEART RATE: 60 BPM | SYSTOLIC BLOOD PRESSURE: 121 MMHG | RESPIRATION RATE: 18 BRPM | DIASTOLIC BLOOD PRESSURE: 74 MMHG | WEIGHT: 190 LBS

## 2022-07-10 PROCEDURE — 74011000250 HC RX REV CODE- 250: Performed by: HOSPITALIST

## 2022-07-10 PROCEDURE — 74011250637 HC RX REV CODE- 250/637: Performed by: HOSPITALIST

## 2022-07-10 RX ADMIN — CLOPIDOGREL BISULFATE 75 MG: 75 TABLET ORAL at 09:42

## 2022-07-10 RX ADMIN — LISINOPRIL 40 MG: 10 TABLET ORAL at 09:46

## 2022-07-10 RX ADMIN — SODIUM CHLORIDE, PRESERVATIVE FREE 10 ML: 5 INJECTION INTRAVENOUS at 09:49

## 2022-07-10 RX ADMIN — HYDROCHLOROTHIAZIDE 12.5 MG: 25 TABLET ORAL at 09:42

## 2022-07-10 RX ADMIN — ASPIRIN 81 MG: 81 TABLET, COATED ORAL at 09:38

## 2022-07-10 RX ADMIN — ISOSORBIDE MONONITRATE 30 MG: 30 TABLET, EXTENDED RELEASE ORAL at 09:38

## 2022-07-10 RX ADMIN — DOCUSATE SODIUM 100 MG: 100 CAPSULE, LIQUID FILLED ORAL at 09:38

## 2022-07-10 NOTE — DISCHARGE SUMMARY
Physician Discharge Summary     Patient ID:    Dafne Johnston  [id number]  22 y.o.  1955    Admit date: 7/8/2022    Discharge date : 7/10/2022    Chronic Diagnoses:    Problem List as of 7/10/2022 Date Reviewed: 7/8/2022          Codes Class Noted - Resolved    Dizziness ICD-10-CM: R42  ICD-9-CM: 780.4  7/8/2022 - Present        Headache ICD-10-CM: R51.9  ICD-9-CM: 784.0  4/21/2022 - Present        TIA (transient ischemic attack) ICD-10-CM: G45.9  ICD-9-CM: 435.9  4/21/2022 - Present        Bradycardia ICD-10-CM: R00.1  ICD-9-CM: 427.89  12/16/2021 - Present        Chest pain ICD-10-CM: R07.9  ICD-9-CM: 786.50  11/23/2021 - Present          22    Final Diagnoses:   Bradycardia [R00.1]  Dizziness [R42]  Essential hypertension  Coronary artery disease with history of PCI  Diabetes mellitus type 2, diet controlled  Degenerative disc disease of cervical spine    Reason for Hospitalization:  Patient is a 78 y. o. male with history of coronary artery disease with stent placement, diabetes, hypertension, hyperlipidemia presented to the ED on 7/8 with complaint of jaw tightness and funny feeling in face. Patient reports having a heart attack about a year ago and was worried his symptoms might indicate another heart attack so he came into the ED. Wife states there have been episodes where his heart rate is low, he turns gray and not looking good. In the ED, he was speaking on the phone with his sister and suddenly grayed out almost as if he was going to pass out but recovered immediately. Blood pressure had been fluctuating but with no significant differences, no hypotension.  Heart rate was in 50s and sometimes lower. The patient denied any dizziness, chest pain, shortness of breath, or palpitations.  In the emergency room he experienced bradycardic episodes dropping into the low 30s many times.  He reported feeling funny in the face and anxious about what is happening with him.  [facility] Course:   He was admitted to cardiac telemetry. He was seen in consultation by cardiology    During his stay heart rate was predominantly in the 50s and 60s. There was no heart rate less than 45    He had some intermittent vague symptoms of feeling funny in the face    Patient was felt stable for discharge home on 7/10. He will follow-up with his primary cardiologist regarding the need for any further outpatient work-up              Discharge Medications:   Current Discharge Medication List      CONTINUE these medications which have NOT CHANGED    Details   isosorbide mononitrate ER (IMDUR) 60 mg CR tablet Take 60 mg by mouth daily. hydrALAZINE (APRESOLINE) 50 mg tablet Take 25 mg by mouth three (3) times daily. hydroCHLOROthiazide (HYDRODIURIL) 25 mg tablet Take 12.5 mg by mouth daily. lisinopriL (PRINIVIL, ZESTRIL) 40 mg tablet Take 1 Tablet by mouth daily. Qty: 30 Tablet, Refills: 0      atorvastatin (LIPITOR) 40 mg tablet Take 1 Tablet by mouth every evening. clopidogreL (Plavix) 75 mg tab Take 75 mg by mouth daily. aspirin delayed-release 81 mg tablet Take 81 mg by mouth daily. Follow up Care:    1. Cony Whitfield MD in 1-2 weeks. Please call to set up an appointment shortly after discharge. Diet:  Cardiac Diet    Disposition:  Home. Advanced Directive:   FULL    DNR      Discharge Exam:  General:  Alert, cooperative, no distress, appears stated age. Lungs:   Clear to auscultation bilaterally. Chest wall:  No tenderness or deformity. Heart:  Regular rate and rhythm, S1, S2 normal, no murmur, click, rub or gallop. Abdomen:   Soft, non-tender. Bowel sounds normal. No masses,  No organomegaly. Extremities: Extremities normal, atraumatic, no cyanosis or edema. Pulses: 2+ and symmetric all extremities. Skin: Skin color, texture, turgor normal. No rashes or lesions   Neurologic: CNII-XII intact.  No gross sensory or motor deficits CONSULTATIONS: Cardiology    Significant Diagnostic Studies:   7/8/2022: BUN 23 mg/dL (H; Ref range: 6 - 20 mg/dL); Calcium 8.7 mg/dL (Ref range: 8.5 - 10.1 mg/dL); CO2 28 mmol/L (Ref range: 21 - 32 mmol/L); Creatinine 1.35 mg/dL (H; Ref range: 0.70 - 1.30 mg/dL); Glucose 163 mg/dL (H; Ref range: 65 - 100 mg/dL); HCT 41.8 % (Ref range: 36.6 - 50.3 %); HGB 14.6 g/dL (Ref range: 12.1 - 17.0 g/dL); Potassium 3.3 mmol/L (L; Ref range: 3.5 - 5.1 mmol/L); Sodium 143 mmol/L (Ref range: 136 - 145 mmol/L)  7/9/2022: BUN 22 mg/dL (H; Ref range: 6 - 20 mg/dL); Calcium 8.5 mg/dL (Ref range: 8.5 - 10.1 mg/dL); CO2 29 mmol/L (Ref range: 21 - 32 mmol/L); Creatinine 1.30 mg/dL (Ref range: 0.70 - 1.30 mg/dL); Glucose 140 mg/dL (H; Ref range: 65 - 100 mg/dL); Potassium 3.3 mmol/L (L; Ref range: 3.5 - 5.1 mmol/L); Sodium 140 mmol/L (Ref range: 136 - 145 mmol/L)  Recent Labs     07/08/22 2116   WBC 9.3   HGB 14.6   HCT 41.8        Recent Labs     07/09/22  0935 07/08/22 2116    143   K 3.3* 3.3*    109*   CO2 29 28   BUN 22* 23*   CREA 1.30 1.35*   * 163*   CA 8.5 8.7     Recent Labs     07/08/22 2116   ALT 25   AP 57   TBILI 0.8   TP 7.0   ALB 4.0   GLOB 3.0     No results for input(s): INR, PTP, APTT, INREXT in the last 72 hours. No results for input(s): FE, TIBC, PSAT, FERR in the last 72 hours. No results for input(s): PH, PCO2, PO2 in the last 72 hours. No results for input(s): CPK, CKMB in the last 72 hours.     No lab exists for component: TROPONINI  Lab Results   Component Value Date/Time    Glucose (POC) 144 (H) 04/21/2022 07:36 AM    Glucose (POC) 145 (H) 04/20/2022 10:06 PM       Discharge time spent 35 minutes    Signed:  Dru Thomas MD  7/10/2022  8:54 AM

## 2022-07-10 NOTE — PROGRESS NOTES
Hospitalist Progress Note               Daily Progress Note: 7/10/2022    Chief Complaint: \"Funny feeling in face\"    Subjective:   Hospital course to date. Patient is a 78 y. o. male with history of coronary artery disease with stent placement, diabetes, hypertension, hyperlipidemia presented to the ED on 7/8 with complaint of jaw tightness and funny feeling in face. Patient reports having a heart attack about a year ago and was worried his symptoms might indicate another heart attack so he came into the ED. Wife states there have been episodes where his heart rate is low, he turns gray and not looking good. In the ED, he was speaking on the phone with his sister and suddenly grayed out almost as if he was going to pass out but recovered immediately. Blood pressure had been fluctuating but with no significant differences, no hypotension.  Heart rate was in 50s and sometimes lower. The patient denied any dizziness, chest pain, shortness of breath, or palpitations.  In the emergency room he experienced bradycardic episodes dropping into the low 30s many times. He reported feeling funny in the face and anxious about what is happening with him.     On 7/9, patient was feeling tired from not sleeping well but no other complaints. Denied any symptoms of dizziness, chest pain, shortness of breath, palpitations. Patient reported within the past year and after his heart attack, he came into the hospital before with a similar funny feeling in face with a buzzing sound but the cause could not be determined. Patient reported he has experienced a similar episode of bradycardia within the past year. Patient was still bradycardic with a pulse of 45. Blood pressure was 145/82.      When he stood up as I was in the room, heart rate improved to around 79  Was seen by Cardiology  ____  The patient is seen for follow up. This morning, patient was still bradycardic but consistent around 50-60.  Blood pressure is 122/66. Patient is feeling well and reports no other complaints at this time. Problem List:  Problem List as of 7/10/2022 Date Reviewed: 2022          Codes Class Noted - Resolved    Dizziness ICD-10-CM: R42  ICD-9-CM: 780.4  2022 - Present        Headache ICD-10-CM: R51.9  ICD-9-CM: 784.0  2022 - Present        TIA (transient ischemic attack) ICD-10-CM: G45.9  ICD-9-CM: 435.9  2022 - Present        Bradycardia ICD-10-CM: R00.1  ICD-9-CM: 427.89  2021 - Present        Chest pain ICD-10-CM: R07.9  ICD-9-CM: 786.50  2021 - Present              Medications reviewed  Current Facility-Administered Medications   Medication Dose Route Frequency    hydrALAZINE (APRESOLINE) tablet 25 mg  25 mg Oral TID    isosorbide mononitrate ER (IMDUR) tablet 30 mg  30 mg Oral DAILY    clopidogreL (PLAVIX) tablet 75 mg  75 mg Oral DAILY    aspirin delayed-release tablet 81 mg  81 mg Oral DAILY    atorvastatin (LIPITOR) tablet 40 mg  40 mg Oral QPM    lisinopriL (PRINIVIL, ZESTRIL) tablet 40 mg  40 mg Oral DAILY    hydroCHLOROthiazide (HYDRODIURIL) tablet 12.5 mg  12.5 mg Oral DAILY    sodium chloride (NS) flush 5-40 mL  5-40 mL IntraVENous Q8H    sodium chloride (NS) flush 5-40 mL  5-40 mL IntraVENous PRN    acetaminophen (TYLENOL) tablet 650 mg  650 mg Oral Q6H PRN    docusate sodium (COLACE) capsule 100 mg  100 mg Oral BID       Review of Systems:   A comprehensive review of systems was negative except for that written in the HPI. Objective:   Physical Exam:     Visit Vitals  /66 (BP 1 Location: Left upper arm, BP Patient Position: At rest;Supine)   Pulse (!) 54   Temp 97.4 °F (36.3 °C)   Resp 18   Ht 5' 10\" (1.778 m)   Wt 86.2 kg (190 lb)   SpO2 99%   BMI 27.26 kg/m²      O2 Device: None (Room air)    Temp (24hrs), Av.8 °F (36.6 °C), Min:97.4 °F (36.3 °C), Max:98.6 °F (37 °C)    No intake/output data recorded.     1901 - 07/10 0700  In: -   Out: 500 [Urine:500]    General: Awake and alert   Lungs:   Clear to auscultation bilaterally. Chest wall:  No tenderness or deformity. Heart:  Regular rate and rhythm, S1, S2 normal, no murmur, click, rub or gallop. Abdomen:   Soft, non-tender. Bowel sounds normal. No masses,  No organomegaly. Extremities: Extremities normal, atraumatic, no cyanosis or edema. Pulses: 2+ and symmetric all extremities. Skin: Skin color, texture, turgor normal. No rashes or lesions   Neurologic: CNII-XII intact. No gross focal deficits         Data Review:       Recent Days:  Recent Labs     07/08/22 2116   WBC 9.3   HGB 14.6   HCT 41.8        Recent Labs     07/09/22  0935 07/08/22 2116    143   K 3.3* 3.3*    109*   CO2 29 28   * 163*   BUN 22* 23*   CREA 1.30 1.35*   CA 8.5 8.7   ALB  --  4.0   TBILI  --  0.8   ALT  --  25     No results for input(s): PH, PCO2, PO2, HCO3, FIO2 in the last 72 hours. 24 Hour Results:  Recent Results (from the past 24 hour(s))   TSH 3RD GENERATION    Collection Time: 07/09/22  9:35 AM   Result Value Ref Range    TSH 3.06 0.36 - 0.25 uIU/mL   METABOLIC PANEL, BASIC    Collection Time: 07/09/22  9:35 AM   Result Value Ref Range    Sodium 140 136 - 145 mmol/L    Potassium 3.3 (L) 3.5 - 5.1 mmol/L    Chloride 107 97 - 108 mmol/L    CO2 29 21 - 32 mmol/L    Anion gap 4 (L) 5 - 15 mmol/L    Glucose 140 (H) 65 - 100 mg/dL    BUN 22 (H) 6 - 20 mg/dL    Creatinine 1.30 0.70 - 1.30 mg/dL    BUN/Creatinine ratio 17 12 - 20      GFR est AA >60 >60 ml/min/1.73m2    GFR est non-AA 55 (L) >60 ml/min/1.73m2    Calcium 8.5 8.5 - 10.1 mg/dL       XR CHEST PORT   Final Result   No evidence of acute cardiopulmonary process.               Assessment:  Sinus bradycardia questionably symptomatic     Benign essential hypertension - has been fluctuating     Coronary artery disease with history of stent placement - stable, continue atorvastatin, aspirin, and plavix     Degenerative disc disease of cervical spine - has appointment with outpatient spine specialist     Diabetes mellitus type 2, diet controlled      Plan:  Admitted to telemetry  Seen by Cardiology  Close monitoring serum electrolytes, renal function  Avoid beta blockers   Continue hydralazine 3x daily  Continue hydrochlorothiazide, isosorbide, lisinopril      Care Plan discussed with: Patient/Family    Disposition:     Total time spent with patient: 30 minutes. Riri Remy  *ATTENTION:  This note has been created by a medical student for educational purposes only. Please do not refer to the content of this note for clinical decision-making, billing, or other purposes. Please see attending physicians note to obtain clinical information on this patient. *

## 2022-07-10 NOTE — PROGRESS NOTES
Progress Note      7/10/2022 7:47 AM  NAME: Nadine Rehman   MRN:  [id number]   Admit Diagnosis: Bradycardia [R00.1]  Dizziness [R42]      Problem List:   Cardiology cross cover progress note-[facility]  Tesha Spivey MD)     PROBLEM LIST:  1. Near syncope  2. Bradycardia  3. Previous myocardial infarction  4. Coronary artery disease (November 23, 2021)       4a. Left main with mild luminal irregularities       4b. LAD proximal previous PCI (30% in-stent stenosis)         4b. i.  Mid LAD 80% stenosis with previous PCI       4c. Left circumflex angiographically normal        4c.i. First obtuse marginal 80% stenosis, suspect . Healed chronic dissection      4d. Right coronary artery angiographically normal  5. Grade I (mild) diastolic dysfunction, or impaired relaxation  6. Hypertension  7. Dyslipidemia  8. Type 2 diabetes  9. Former tobacco use  10. Obstructive sleep apnea (noncompliant with CPAP)     11. Chronic kidney disease  12. Gastroesophageal reflux disease (GERD)  13. Arthritis  14. Hypokalemia       Subjective:     Nadine Rehman denies chest pain, dyspnea. Discussed with RN events overnight.      Medications Personally Reviewed:    Current Facility-Administered Medications   Medication Dose Route Frequency    hydrALAZINE (APRESOLINE) tablet 25 mg  25 mg Oral TID    isosorbide mononitrate ER (IMDUR) tablet 30 mg  30 mg Oral DAILY    clopidogreL (PLAVIX) tablet 75 mg  75 mg Oral DAILY    aspirin delayed-release tablet 81 mg  81 mg Oral DAILY    atorvastatin (LIPITOR) tablet 40 mg  40 mg Oral QPM    lisinopriL (PRINIVIL, ZESTRIL) tablet 40 mg  40 mg Oral DAILY    hydroCHLOROthiazide (HYDRODIURIL) tablet 12.5 mg  12.5 mg Oral DAILY    sodium chloride (NS) flush 5-40 mL  5-40 mL IntraVENous Q8H    sodium chloride (NS) flush 5-40 mL  5-40 mL IntraVENous PRN    acetaminophen (TYLENOL) tablet 650 mg  650 mg Oral Q6H PRN    docusate sodium (COLACE) capsule 100 mg  100 mg Oral BID           Objective:      Physical Exam:  Last 24hrs VS reviewed since prior progress note. Most recent are:    Visit Vitals  /66 (BP 1 Location: Left upper arm, BP Patient Position: At rest;Supine)   Pulse (!) 54   Temp 97.4 °F (36.3 °C)   Resp 18   Ht 5' 10\" (1.778 m)   Wt 86.2 kg (190 lb)   SpO2 99%   BMI 27.26 kg/m²     No intake or output data in the 24 hours ending 07/10/22 0747     General Appearance: Well developed, well nourished, alert & oriented x 3, no acute distress. Chest: Lungs clear to auscultation bilaterally. Cardiovascular: JVP is not elevated, PMI is not displaced, normal intensity S1 and S2, without S3. Abdomen: Soft, non-tender, bowel sounds are active. Extremities: No edema bilaterally. Data Review    Telemetry: Sinus rhythm without ventricular ectopy    EKG:   []  No new EKG for review    Lab Data Personally Reviewed:    Recent Labs     07/08/22 2116   WBC 9.3   HGB 14.6   HCT 41.8        No results for input(s): INR, PTP, APTT, INREXT in the last 72 hours. Recent Labs     07/09/22  0935 07/08/22 2116    143   K 3.3* 3.3*    109*   CO2 29 28   BUN 22* 23*   CREA 1.30 1.35*   * 163*   CA 8.5 8.7     No results for input(s): CPK, CKNDX, TROIQ in the last 72 hours. No lab exists for component: CPKMB  Lab Results   Component Value Date/Time    Cholesterol, total 128 06/21/2022 12:45 PM    HDL Cholesterol 30 02/28/2022 11:10 AM    LDL,Direct 71 06/21/2022 12:45 PM    LDL, calculated 85 02/28/2022 11:10 AM    Triglyceride 224 (H) 06/21/2022 12:45 PM    CHOL/HDL Ratio 5.0 02/28/2022 11:10 AM       Recent Labs     07/08/22 2116   AP 57   TP 7.0   ALB 4.0   GLOB 3.0     No results for input(s): PH, PCO2, PO2 in the last 72 hours.         Assessment/Plan:        Juan Barrios MD

## 2022-07-10 NOTE — PROGRESS NOTES
Problem: General Medical Care Plan  Goal: *Vital signs within specified parameters  Outcome: Progressing Towards Goal  Goal: *Labs within defined limits  Outcome: Progressing Towards Goal  Goal: *Skin integrity maintained  Outcome: Progressing Towards Goal     Problem: Patient Education: Go to Patient Education Activity  Goal: Patient/Family Education  Outcome: Progressing Towards Goal

## 2022-07-11 LAB
ATRIAL RATE: 53 BPM
CALCULATED P AXIS, ECG09: 57 DEGREES
CALCULATED R AXIS, ECG10: -8 DEGREES
CALCULATED T AXIS, ECG11: 123 DEGREES
DIAGNOSIS, 93000: NORMAL
P-R INTERVAL, ECG05: 210 MS
Q-T INTERVAL, ECG07: 416 MS
QRS DURATION, ECG06: 92 MS
QTC CALCULATION (BEZET), ECG08: 390 MS
VENTRICULAR RATE, ECG03: 53 BPM

## 2022-07-19 NOTE — PROGRESS NOTES
Physician Progress Note      PATIENT:               Shyam Schmidt  CSN #:                  [id number]  :                       1955  ADMIT DATE:       2022 8:45 PM  Nicci Vera DATE:        7/10/2022 10:24 AM  RESPONDING  PROVIDER #:        Ralph Lord MD          QUERY TEXT:    Patient admitted with near syncope and noted to have EKG abnormality and documentation of adjustments to beta blocker therapy. If possible, please document in progress notes and discharge summary if you are evaluating and/or treating any of the following: The medical record reflects the following:  Risk Factors: ***  Clinical Indicators:    Ronen Miles RN ED Triage Note  Pt states his BP has been going up and down d/t PCP making adjustments to hydralazine. States when his BP is high he's dizzy and when it's low his color changes per wife    Nadyne Lennox MD ED Provider Note  Patient feels that he is slowed the moment. Is been dealing with his blood pressure recently and was started on hydralazine. Due to his dizziness, his primary care doctor cut his dose of hydralazine to half. He is denying any chest pain, shortness of breath, nausea, or vomiting. Symptoms of been going for several days.  EKG was done at 8:40 PM and interpreted by me as sinus pericardia rate of 53 with first-degree AV block, IA is prolonged, QRS and QTc within normal, left axis deviation, no ST elevation or depression, no signs of dysrhythmia     Cardiology Consult  PROBLEM LIST:  1. Near syncope  2. Bradycardia    He does have a history of bradycardia. In the past it has been exacerbated by beta-blocker therapy.   Avoid all agents acting through the atrioventricular node    Treatment: EKG, Telemetry Monitoring, Cardiology Consult, Lab Monitoring, CPAP compliance, avoid medications/agents that act through AV node  Options provided:  -- Admitting signs and symptoms due to Sinus bradycardia with sinus arrhythmia with 1st degree A-V block  -- Admitting signs and symptoms due to beta blocker therapy  -- Other - I will add my own diagnosis  -- Disagree - Not applicable / Not valid  -- Disagree - Clinically unable to determine / Unknown  -- Refer to Clinical Documentation Reviewer    PROVIDER RESPONSE TEXT:    Admitting signs and symptoms due to Sinus bradycardia with sinus arrhythmia with 1st degree A-V block    Query created by: Rhett Given on 7/13/2022 3:05 PM      Electronically signed by:  Wade Zheng MD 7/19/2022 4:01 PM

## 2022-12-14 ENCOUNTER — CLINICAL DOCUMENTATION (OUTPATIENT)
Facility: HOSPITAL | Age: 67
End: 2022-12-14

## 2022-12-14 ENCOUNTER — TRANSCRIBE ORDER (OUTPATIENT)
Dept: REGISTRATION | Age: 67
End: 2022-12-14

## 2022-12-14 ENCOUNTER — HOSPITAL ENCOUNTER (OUTPATIENT)
Dept: LAB | Age: 67
Discharge: HOME | End: 2022-12-14

## 2022-12-14 DIAGNOSIS — E11.9 DIABETES MELLITUS (HCC): ICD-10-CM

## 2022-12-14 DIAGNOSIS — I25.10 CVD (CARDIOVASCULAR DISEASE): Primary | ICD-10-CM

## 2022-12-14 DIAGNOSIS — E78.2 MIXED HYPERLIPIDEMIA: ICD-10-CM

## 2022-12-14 DIAGNOSIS — I10 ESSENTIAL HYPERTENSION, MALIGNANT: ICD-10-CM

## 2022-12-14 DIAGNOSIS — I25.10 CVD (CARDIOVASCULAR DISEASE): ICD-10-CM

## 2023-02-12 ENCOUNTER — APPOINTMENT (OUTPATIENT)
Dept: GENERAL RADIOLOGY | Age: 68
End: 2023-02-12
Attending: EMERGENCY MEDICINE
Payer: MEDICARE

## 2023-02-12 ENCOUNTER — HOSPITAL ENCOUNTER (EMERGENCY)
Age: 68
Discharge: HOME | End: 2023-02-12
Attending: EMERGENCY MEDICINE
Payer: MEDICARE

## 2023-02-12 VITALS
TEMPERATURE: 97.4 F | OXYGEN SATURATION: 99 % | BODY MASS INDEX: 27.2 KG/M2 | HEIGHT: 70 IN | RESPIRATION RATE: 18 BRPM | WEIGHT: 190 LBS | SYSTOLIC BLOOD PRESSURE: 133 MMHG | DIASTOLIC BLOOD PRESSURE: 69 MMHG | HEART RATE: 71 BPM

## 2023-02-12 DIAGNOSIS — M25.462 EFFUSION OF LEFT KNEE: Primary | ICD-10-CM

## 2023-02-12 LAB
APPEARANCE FLD: ABNORMAL
BODY FLD TYPE: NORMAL
COLOR FLD: ABNORMAL
CRYSTALS FLD MICRO: NORMAL
LYMPHOCYTES NFR FLD: 6 %
MONOCYTES NFR FLD: 5 %
NEUTROPHILS NFR FLD: 89 %
NUC CELL # FLD: 170 /CU MM
RBC # FLD: >100 /CU MM
SPECIMEN SOURCE FLD: ABNORMAL

## 2023-02-12 PROCEDURE — 89060 EXAM SYNOVIAL FLUID CRYSTALS: CPT

## 2023-02-12 PROCEDURE — 75810000054 HC ARTHOCENTISIS JOINT

## 2023-02-12 PROCEDURE — 99284 EMERGENCY DEPT VISIT MOD MDM: CPT

## 2023-02-12 PROCEDURE — 73564 X-RAY EXAM KNEE 4 OR MORE: CPT

## 2023-02-12 PROCEDURE — 89050 BODY FLUID CELL COUNT: CPT

## 2023-02-12 PROCEDURE — 87205 SMEAR GRAM STAIN: CPT

## 2023-02-12 RX ORDER — PREDNISONE 20 MG/1
60 TABLET ORAL DAILY
Qty: 15 TABLET | Refills: 0 | Status: SHIPPED | OUTPATIENT
Start: 2023-02-12 | End: 2023-02-17

## 2023-02-12 RX ORDER — HYDROCODONE BITARTRATE AND ACETAMINOPHEN 5; 325 MG/1; MG/1
1 TABLET ORAL
Qty: 12 TABLET | Refills: 0 | Status: SHIPPED | OUTPATIENT
Start: 2023-02-12 | End: 2023-02-15

## 2023-02-12 NOTE — DISCHARGE INSTRUCTIONS
Thank you! Thank you for allowing me to care for you in the emergency department. It is my goal to provide you with excellent care. If you have not received excellent quality care, please ask to speak to the nurse manager. Please fill out the survey that will come to you by mail or email since we listen to your feedback! Below you will find a list of your tests from today's visit. Should you have any questions, please do not hesitate to call the emergency department. Labs  No results found for this or any previous visit (from the past 12 hour(s)). Radiologic Studies  XR KNEE LT MIN 4 V   Final Result      Joint effusion. No fracture. CT Results  (Last 48 hours)      None          CXR Results  (Last 48 hours)      None          ------------------------------------------------------------------------------------------------------------  The exam and treatment you received in the Emergency Department were for an urgent problem and are not intended as complete care. It is important that you follow-up with a doctor, nurse practitioner, or physician assistant to:  (1) confirm your diagnosis,  (2) re-evaluation of changes in your illness and treatment, and  (3) for ongoing care. Please take your discharge instructions with you when you go to your follow-up appointment. If you have any problem arranging a follow-up appointment, contact the Emergency Department. If your symptoms become worse or you do not improve as expected and you are unable to reach your health care provider, please return to the Emergency Department. We are available 24 hours a day. If a prescription has been provided, please have it filled as soon as possible to prevent a delay in treatment. If you have any questions or reservations about taking the medication due to side effects or interactions with other medications, please call your primary care provider or contact the ER.

## 2023-02-12 NOTE — ED PROVIDER NOTES
EMERGENCY DEPARTMENT HISTORY AND PHYSICAL EXAM      Date: 2/12/2023  Patient Name: Kraig Young    History of Presenting Illness     Chief Complaint   Patient presents with    Knee Pain       History Provided By: Patient    HPI: Kraig Young, 75 y.o. male with a past medical history significant No significant past medical history presents to the ED with chief complaint of Knee Pain  . 59-year-old male presents with severe left knee pain. History of traumatic injury 20 years ago. When the weather gets bad it does cause him to have pain. Patient developed sudden onset of swelling however yesterday. Pain was so severe he could not sleep cannot walk cannot move it with the pain. No trauma that he can recall no new falls or injury. There are no other complaints, changes, or physical findings at this time. PCP: Pebbles Morfin MD    Current Outpatient Medications   Medication Sig Dispense Refill    HYDROcodone-acetaminophen (Norco) 5-325 mg per tablet Take 1 Tablet by mouth every six (6) hours as needed for Pain for up to 3 days. Max Daily Amount: 4 Tablets. 12 Tablet 0    predniSONE (DELTASONE) 20 mg tablet Take 3 Tablets by mouth daily for 5 days. With Breakfast 15 Tablet 0    isosorbide mononitrate ER (IMDUR) 60 mg CR tablet Take 60 mg by mouth daily. hydrALAZINE (APRESOLINE) 50 mg tablet Take 25 mg by mouth three (3) times daily. hydroCHLOROthiazide (HYDRODIURIL) 25 mg tablet Take 12.5 mg by mouth daily. lisinopriL (PRINIVIL, ZESTRIL) 40 mg tablet Take 1 Tablet by mouth daily. 30 Tablet 0    atorvastatin (LIPITOR) 40 mg tablet Take 1 Tablet by mouth every evening. clopidogreL (Plavix) 75 mg tab Take 75 mg by mouth daily. aspirin delayed-release 81 mg tablet Take 81 mg by mouth daily.          Past History     Past Medical History:  Past Medical History:   Diagnosis Date    Arthritis     CAD (coronary artery disease)     patient stated he has 2 stents Diabetes ([facility] [address]     Patient stated diet controlled monitors sugar daily not currently on medications    GERD (gastroesophageal reflux disease)     Hyperlipidemia     Hypertension     Ill-defined condition     patient stated approx 2 weeks ago he had a knot in his left leg after wearing tight socks went to his PCP was told it was a blood clot and was told to apply heat he stated it has gotten better stated small knot remains pain had ceased as well as swelling    Sleep apnea     Patient stated he had a CPAP in the past no longer has to use        Past Surgical History:  Past Surgical History:   Procedure Laterality Date    HX COLONOSCOPY      HX ORTHOPAEDIC      pt states he's had 4 back surgeries    IA ABDOMEN SURGERY PROC UNLISTED      hernia repair times 3    IA CARDIAC SURG PROCEDURE UNLIST      cardiac cath 2020       Family History:  Family History   Problem Relation Age of Onset    Heart Disease Mother     Heart Disease Father        Social History:  Social History     Tobacco Use    Smoking status: Former     Packs/day: 0.50     Types: Cigarettes    Smokeless tobacco: Never   Vaping Use    Vaping Use: Never used   Substance Use Topics    Alcohol use: Not Currently     Comment: Used to only drink on special occassions    Drug use: Never       Allergies: Allergies   Allergen Reactions    Penicillins Other (comments)     Patient stated as a child had convulsions, nausea and vomiting          Review of Systems   Review of Systems   Constitutional: Negative. Negative for chills, fatigue and fever. HENT: Negative. Negative for congestion, ear pain, nosebleeds and sore throat. Eyes: Negative. Negative for pain, discharge and visual disturbance. Respiratory: Negative. Negative for cough, chest tightness and shortness of breath. Cardiovascular: Negative. Negative for chest pain and leg swelling. Gastrointestinal: Negative.   Negative for abdominal pain, blood in stool, constipation, diarrhea, nausea and vomiting. Endocrine: Negative. Genitourinary: Negative. Negative for difficulty urinating, dysuria and flank pain. Musculoskeletal:  Positive for joint swelling. Negative for back pain and myalgias. Skin: Negative. Negative for rash and wound. Allergic/Immunologic: Negative. Neurological: Negative. Negative for dizziness, syncope, weakness, numbness and headaches. Hematological: Negative. Does not bruise/bleed easily. Psychiatric/Behavioral: Negative. Negative for agitation, confusion, hallucinations and suicidal ideas. All other systems reviewed and are negative. Positives and Pertinent negatives as per HPI. Physical Exam   Patient Vitals for the past 24 hrs:   Temp Pulse Resp BP SpO2   02/12/23 1333 97.4 °F (36.3 °C) 71 18 133/69 99 %         Physical Exam  Vitals and nursing note reviewed. Constitutional:       General: He is not in acute distress. Appearance: He is normal weight. He is not ill-appearing. HENT:      Head: Normocephalic and atraumatic. Right Ear: External ear normal.      Left Ear: External ear normal.      Nose: Nose normal. No rhinorrhea. Mouth/Throat:      Mouth: Mucous membranes are moist.      Pharynx: Oropharynx is clear. Eyes:      Extraocular Movements: Extraocular movements intact. Conjunctiva/sclera: Conjunctivae normal.      Pupils: Pupils are equal, round, and reactive to light. Cardiovascular:      Rate and Rhythm: Normal rate and regular rhythm. Pulses: Normal pulses. Heart sounds: Normal heart sounds. Pulmonary:      Effort: Pulmonary effort is normal. No respiratory distress. Breath sounds: Normal breath sounds. Abdominal:      General: Abdomen is flat. Bowel sounds are normal.      Palpations: Abdomen is soft. Musculoskeletal:         General: Tenderness present. No deformity. Normal range of motion. Cervical back: Normal range of motion and neck supple.       Comments: L knee swelling abobe Skin:     General: Skin is warm and dry. Capillary Refill: Capillary refill takes less than 2 seconds. Findings: No bruising, lesion or rash. Neurological:      General: No focal deficit present. Mental Status: He is alert and oriented to person, place, and time. Mental status is at baseline. Psychiatric:         Mood and Affect: Mood normal.         Behavior: Behavior normal.         Thought Content: Thought content normal.         Judgment: Judgment normal.       Diagnostic Study Results     LABS: No results found for this or any previous visit (from the past 12 hour(s)). EKG: EKG interpreted independently by ER physician. RADIOLOGY:  Non-plain film images such as CT, Ultrasound and MRI are read by the radiologist. Plain radiographic images are visualized and preliminarily interpreted by the ED Provider with the below findings:     Independently interpreted by ER physician no fracture or large effusion. Interpretation per the Radiologist below, if available at the time of this note:     XR KNEE LT MIN 4 V    Result Date: 2/12/2023  EXAM: XR KNEE LT MIN 4 V INDICATION: Left knee pain and swelling. COMPARISON: None. FINDINGS: Four views of the left knee demonstrate no fracture or other acute osseous or articular abnormality. There is a suprapatellar joint effusion. Mild patellofemoral and medial compartment osteoarthritis. Small marginal osteophytes. No erosion or chondrocalcinosis. Joint effusion. No fracture. CT Results  (Last 48 hours)      None          CXR Results  (Last 48 hours)      None            Medical Decision Making and ED Course       CC/HPI Summary, DDx, ED Course, and Reassessment: See 5year-old male with left knee swelling differential includes gout versus effusion versus hemarthrosis versus fracture versus tendon rupture. Plan for x-ray and reevaluation.     These orders were placed during the ER evaluation:  Orders Placed This Encounter    CULTURE, BODY FLUID W GRAM STAIN     L     Standing Status:   Standing     Number of Occurrences:   1    XR KNEE LT MIN 4 V     Standing Status:   Standing     Number of Occurrences:   1     Order Specific Question:   Transport     Answer:   Wheelchair [7]     Order Specific Question:   Reason for Exam     Answer:   swelling and pain    CELL COUNT, BODY FLUID     Standing Status:   Standing     Number of Occurrences:   1     Order Specific Question:   Fluid type: Answer:   Joint, Knee [55]    CRYSTALS, SYNOVIAL FLUID     Standing Status:   Standing     Number of Occurrences:   1     Order Specific Question:   Fluid type: Answer:   Joint, Knee [55]    HYDROcodone-acetaminophen (Norco) 5-325 mg per tablet     Sig: Take 1 Tablet by mouth every six (6) hours as needed for Pain for up to 3 days. Max Daily Amount: 4 Tablets. Dispense:  12 Tablet     Refill:  0    predniSONE (DELTASONE) 20 mg tablet     Sig: Take 3 Tablets by mouth daily for 5 days. With Breakfast     Dispense:  15 Tablet     Refill:  0        Patient was given the following medications:  Medications - No data to display        ED Course:       ED Course as of 02/12/23 1446   Sun Feb 12, 2023   1429 XR knee:    FINDINGS: Four views of the left knee demonstrate no fracture or other acute  osseous or articular abnormality. There is a suprapatellar joint effusion. Mild  patellofemoral and medial compartment osteoarthritis. Small marginal  osteophytes. No erosion or chondrocalcinosis. IMPRESSION     Joint effusion. No fracture. [HP]      ED Course User Index  [HP] Deena SANTILLAN MD         Vital Signs-Reviewed the patient's vital signs.   Patient Vitals for the past 24 hrs:   Temp Pulse Resp BP SpO2   02/12/23 1333 97.4 °F (36.3 °C) 71 18 133/69 99 %     Vitals interpreted independently by ER physician. stable    Medical decision making tools:                No data recorded          Disposition Considerations (Tests not done, Shared Decision Making, Pt Expectation of Test or Tx.): X-rays negative for fracture. Joint aspiration performed. Noncloudy fluid improved patient swelling and pain level. Patient be discharged home with medication to treat possible gout. Crystals level sent. No evidence of infection. Also orthopedics for follow-up as he does have chronic knee issues. Knee immobilizer crutches as needed. CONSULTS: (Who and What was discussed)  None    Chronic Conditions: no    Social Determinants affecting Dx or Tx: None    Records Reviewed (source and summary of external notes): None  Records Reviewed: Previous Hospital chart. EMS run report. I reviewed the vital signs, available nursing notes, past medical history, past surgical history, family history and social history. Initial assessment performed. The patients presenting problems have been discussed, and they are in agreement with the care plan formulated and outlined with them. I have encouraged them to ask questions as they arise throughout their visit. Discharged      Procedures     Joint aspiration of left knee effusion performed by ER physician under sterile procedure. Anesthetized with 1% lidocaine with epi 8 cc. Patient tolerated well. Sterile procedure followed. Betadine. Suprapatellar joint effusion aspirated with 30 cc of noncloudy sanguinous fluid. Gauge needle. Swelling dramatically improved. Bandage applied. Knee immobilizer and crutches provided. No blood loss. Patient tolerated procedure well no complication. Disposition       Emergency Department Disposition:  Discharged      Diagnosis     Clinical Impression:   1. Effusion of left knee        Attestations:    Anand Elise MD    Please note that this dictation was completed with [email or web address], the computer voice recognition software. Quite often unanticipated grammatical, syntax, homophones, and other interpretive errors are inadvertently transcribed by the computer software.   Please disregard these errors. Please excuse any errors that have escaped final proofreading. Thank you.

## 2023-02-12 NOTE — Clinical Note
[facility] EMERGENCY DEPT  [address] [place] [id number]  [phone]    Work/School Note    Date: 2/12/2023    To Whom It May concern:    Sancho Castro was seen and treated today in the emergency room by the following provider(s):  Attending Provider: Martha Smith MD.      Sancho Castro is excused from work/school on 02/12/23 and 02/13/23. He is medically clear to return to work/school on 2/14/2023.        Sincerely,          Maxwell Diaz MD

## 2023-02-12 NOTE — ED TRIAGE NOTES
Pt presents for left knee pain and swelling that been getting worse over the past few days. States he's had significant injury to involving heavy machinery in his 25s and it's bothered him on and off ever since.

## 2023-02-17 LAB
BACTERIA SPEC CULT: NORMAL
GRAM STN SPEC: NORMAL
GRAM STN SPEC: NORMAL
SPECIAL REQUESTS,SREQ: NORMAL

## 2023-04-03 ENCOUNTER — HOSPITAL ENCOUNTER (OUTPATIENT)
Dept: LAB | Age: 68
End: 2023-04-03
Payer: MEDICARE

## 2023-04-03 ENCOUNTER — TRANSCRIBE ORDER (OUTPATIENT)
Dept: REGISTRATION | Age: 68
End: 2023-04-03

## 2023-04-03 DIAGNOSIS — E11.22 TYPE 2 DIABETES MELLITUS WITH ESRD (END-STAGE RENAL DISEASE) (HCC): ICD-10-CM

## 2023-04-03 DIAGNOSIS — I10 ESSENTIAL HYPERTENSION, MALIGNANT: ICD-10-CM

## 2023-04-03 DIAGNOSIS — E11.22 TYPE 2 DIABETES MELLITUS WITH ESRD (END-STAGE RENAL DISEASE) (HCC): Primary | ICD-10-CM

## 2023-04-03 DIAGNOSIS — E78.5 HYPERLIPEMIA: ICD-10-CM

## 2023-04-03 DIAGNOSIS — N18.6 TYPE 2 DIABETES MELLITUS WITH ESRD (END-STAGE RENAL DISEASE) (HCC): Primary | ICD-10-CM

## 2023-04-03 DIAGNOSIS — N18.6 TYPE 2 DIABETES MELLITUS WITH ESRD (END-STAGE RENAL DISEASE) (HCC): ICD-10-CM

## 2023-04-03 LAB
ALBUMIN SERPL-MCNC: 3.9 G/DL (ref 3.5–5)
ALBUMIN/GLOB SERPL: 1 (ref 1.1–2.2)
ALP SERPL-CCNC: 67 U/L (ref 45–117)
ALT SERPL-CCNC: 27 U/L (ref 12–78)
ANION GAP SERPL CALC-SCNC: 5 MMOL/L (ref 5–15)
AST SERPL W P-5'-P-CCNC: 17 U/L (ref 15–37)
BASOPHILS # BLD: 0.1 K/UL (ref 0–0.1)
BASOPHILS NFR BLD: 1 % (ref 0–1)
BILIRUB SERPL-MCNC: 0.9 MG/DL (ref 0.2–1)
BUN SERPL-MCNC: 24 MG/DL (ref 6–20)
BUN/CREAT SERPL: 19 (ref 12–20)
CA-I BLD-MCNC: 8.8 MG/DL (ref 8.5–10.1)
CHLORIDE SERPL-SCNC: 106 MMOL/L (ref 97–108)
CO2 SERPL-SCNC: 29 MMOL/L (ref 21–32)
CREAT SERPL-MCNC: 1.24 MG/DL (ref 0.7–1.3)
DIFFERENTIAL METHOD BLD: NORMAL
EOSINOPHIL # BLD: 0.4 K/UL (ref 0–0.4)
EOSINOPHIL NFR BLD: 4 % (ref 0–7)
ERYTHROCYTE [DISTWIDTH] IN BLOOD BY AUTOMATED COUNT: 13.2 % (ref 11.5–14.5)
GLOBULIN SER CALC-MCNC: 3.8 G/DL (ref 2–4)
GLUCOSE SERPL-MCNC: 138 MG/DL (ref 65–100)
HCT VFR BLD AUTO: 45 % (ref 36.6–50.3)
HGB BLD-MCNC: 15.1 G/DL (ref 12.1–17)
IMM GRANULOCYTES # BLD AUTO: 0 K/UL (ref 0–0.04)
IMM GRANULOCYTES NFR BLD AUTO: 0 % (ref 0–0.5)
LYMPHOCYTES # BLD: 1.6 K/UL (ref 0.8–3.5)
LYMPHOCYTES NFR BLD: 17 % (ref 12–49)
MCH RBC QN AUTO: 29.4 PG (ref 26–34)
MCHC RBC AUTO-ENTMCNC: 33.6 G/DL (ref 30–36.5)
MCV RBC AUTO: 87.7 FL (ref 80–99)
MONOCYTES # BLD: 0.4 K/UL (ref 0–1)
MONOCYTES NFR BLD: 5 % (ref 5–13)
NEUTS SEG # BLD: 6.9 K/UL (ref 1.8–8)
NEUTS SEG NFR BLD: 73 % (ref 32–75)
NRBC # BLD: 0 K/UL (ref 0–0.01)
NRBC BLD-RTO: 0 PER 100 WBC
PLATELET # BLD AUTO: 225 K/UL (ref 150–400)
PMV BLD AUTO: 9.5 FL (ref 8.9–12.9)
POTASSIUM SERPL-SCNC: 3.7 MMOL/L (ref 3.5–5.1)
PROT SERPL-MCNC: 7.7 G/DL (ref 6.4–8.2)
RBC # BLD AUTO: 5.13 M/UL (ref 4.1–5.7)
SODIUM SERPL-SCNC: 140 MMOL/L (ref 136–145)
TSH SERPL DL<=0.05 MIU/L-ACNC: 2.31 UIU/ML (ref 0.36–3.74)
URATE SERPL-MCNC: 8.3 MG/DL (ref 3.5–7.2)
WBC # BLD AUTO: 9.5 K/UL (ref 4.1–11.1)

## 2023-04-03 PROCEDURE — 80053 COMPREHEN METABOLIC PANEL: CPT

## 2023-04-03 PROCEDURE — 84443 ASSAY THYROID STIM HORMONE: CPT

## 2023-04-03 PROCEDURE — 83036 HEMOGLOBIN GLYCOSYLATED A1C: CPT

## 2023-04-03 PROCEDURE — 80061 LIPID PANEL: CPT

## 2023-04-03 PROCEDURE — 84550 ASSAY OF BLOOD/URIC ACID: CPT

## 2023-04-03 PROCEDURE — 36415 COLL VENOUS BLD VENIPUNCTURE: CPT

## 2023-04-03 PROCEDURE — 85025 COMPLETE CBC W/AUTO DIFF WBC: CPT

## 2023-04-04 LAB
CHOLEST SERPL-MCNC: 128 MG/DL
EST. AVERAGE GLUCOSE BLD GHB EST-MCNC: 140 MG/DL
HBA1C MFR BLD: 6.5 % (ref 4–5.6)
HDLC SERPL-MCNC: 26 MG/DL
HDLC SERPL: 4.9 (ref 0–5)
LDLC SERPL CALC-MCNC: 58.8 MG/DL (ref 0–100)
LIPID PROFILE,FLP: ABNORMAL
TRIGL SERPL-MCNC: 216 MG/DL (ref ?–150)
VLDLC SERPL CALC-MCNC: 43.2 MG/DL

## 2023-05-23 RX ORDER — CLOPIDOGREL BISULFATE 75 MG/1
75 TABLET ORAL DAILY
COMMUNITY

## 2023-05-23 RX ORDER — HYDRALAZINE HYDROCHLORIDE 50 MG/1
25 TABLET, FILM COATED ORAL 3 TIMES DAILY
COMMUNITY
Start: 2022-06-09

## 2023-05-23 RX ORDER — LISINOPRIL 40 MG/1
40 TABLET ORAL DAILY
COMMUNITY
Start: 2021-12-17

## 2023-05-23 RX ORDER — HYDROCHLOROTHIAZIDE 25 MG/1
12.5 TABLET ORAL DAILY
COMMUNITY
Start: 2022-04-04

## 2023-05-23 RX ORDER — ISOSORBIDE MONONITRATE 60 MG/1
60 TABLET, EXTENDED RELEASE ORAL DAILY
COMMUNITY
Start: 2022-06-06

## 2023-05-23 RX ORDER — ATORVASTATIN CALCIUM 40 MG/1
1 TABLET, FILM COATED ORAL EVERY EVENING
COMMUNITY
Start: 2021-12-02

## 2023-05-23 RX ORDER — ASPIRIN 81 MG/1
81 TABLET ORAL DAILY
COMMUNITY

## 2023-05-25 RX ORDER — CHLORTHALIDONE 25 MG/1
25 TABLET ORAL DAILY
COMMUNITY

## 2023-05-25 RX ORDER — AMLODIPINE BESYLATE 5 MG/1
5 TABLET ORAL DAILY
COMMUNITY

## 2023-05-25 RX ORDER — SODIUM CHLORIDE, SODIUM LACTATE, POTASSIUM CHLORIDE, CALCIUM CHLORIDE 600; 310; 30; 20 MG/100ML; MG/100ML; MG/100ML; MG/100ML
INJECTION, SOLUTION INTRAVENOUS CONTINUOUS
Status: CANCELLED | OUTPATIENT
Start: 2023-05-31

## 2023-05-31 ENCOUNTER — ANESTHESIA (OUTPATIENT)
Facility: HOSPITAL | Age: 68
End: 2023-05-31
Payer: MEDICARE

## 2023-05-31 ENCOUNTER — ANESTHESIA EVENT (OUTPATIENT)
Facility: HOSPITAL | Age: 68
End: 2023-05-31
Payer: MEDICARE

## 2023-05-31 ENCOUNTER — HOSPITAL ENCOUNTER (OUTPATIENT)
Facility: HOSPITAL | Age: 68
Setting detail: OUTPATIENT SURGERY
Discharge: HOME | End: 2023-05-31
Attending: INTERNAL MEDICINE | Admitting: INTERNAL MEDICINE
Payer: MEDICARE

## 2023-05-31 VITALS
HEART RATE: 50 BPM | HEIGHT: 70 IN | DIASTOLIC BLOOD PRESSURE: 60 MMHG | OXYGEN SATURATION: 98 % | TEMPERATURE: 97.3 F | SYSTOLIC BLOOD PRESSURE: 134 MMHG | RESPIRATION RATE: 16 BRPM | BODY MASS INDEX: 27.49 KG/M2 | WEIGHT: 192 LBS

## 2023-05-31 DIAGNOSIS — Z12.11 SCREEN FOR COLON CANCER: ICD-10-CM

## 2023-05-31 PROCEDURE — 2500000003 HC RX 250 WO HCPCS: Performed by: NURSE ANESTHETIST, CERTIFIED REGISTERED

## 2023-05-31 PROCEDURE — 3700000000 HC ANESTHESIA ATTENDED CARE: Performed by: INTERNAL MEDICINE

## 2023-05-31 PROCEDURE — 3700000001 HC ADD 15 MINUTES (ANESTHESIA): Performed by: INTERNAL MEDICINE

## 2023-05-31 PROCEDURE — 2580000003 HC RX 258: Performed by: NURSE ANESTHETIST, CERTIFIED REGISTERED

## 2023-05-31 PROCEDURE — 7100000010 HC PHASE II RECOVERY - FIRST 15 MIN: Performed by: INTERNAL MEDICINE

## 2023-05-31 PROCEDURE — 7100000011 HC PHASE II RECOVERY - ADDTL 15 MIN: Performed by: INTERNAL MEDICINE

## 2023-05-31 PROCEDURE — 88305 TISSUE EXAM BY PATHOLOGIST: CPT

## 2023-05-31 PROCEDURE — 2709999900 HC NON-CHARGEABLE SUPPLY: Performed by: INTERNAL MEDICINE

## 2023-05-31 PROCEDURE — 3600007512: Performed by: INTERNAL MEDICINE

## 2023-05-31 PROCEDURE — 3600007502: Performed by: INTERNAL MEDICINE

## 2023-05-31 PROCEDURE — 6360000002 HC RX W HCPCS: Performed by: NURSE ANESTHETIST, CERTIFIED REGISTERED

## 2023-05-31 RX ORDER — SODIUM CHLORIDE, SODIUM LACTATE, POTASSIUM CHLORIDE, CALCIUM CHLORIDE 600; 310; 30; 20 MG/100ML; MG/100ML; MG/100ML; MG/100ML
INJECTION, SOLUTION INTRAVENOUS CONTINUOUS PRN
Status: DISCONTINUED | OUTPATIENT
Start: 2023-05-31 | End: 2023-05-31 | Stop reason: SDUPTHER

## 2023-05-31 RX ORDER — LIDOCAINE HYDROCHLORIDE 20 MG/ML
INJECTION, SOLUTION EPIDURAL; INFILTRATION; INTRACAUDAL; PERINEURAL
Status: COMPLETED
Start: 2023-05-31 | End: 2023-05-31

## 2023-05-31 RX ORDER — EPHEDRINE SULFATE 50 MG/ML
INJECTION INTRAVENOUS
Status: COMPLETED
Start: 2023-05-31 | End: 2023-05-31

## 2023-05-31 RX ORDER — EPHEDRINE SULFATE 50 MG/ML
INJECTION INTRAVENOUS PRN
Status: DISCONTINUED | OUTPATIENT
Start: 2023-05-31 | End: 2023-05-31 | Stop reason: SDUPTHER

## 2023-05-31 RX ADMIN — PROPOFOL 20 MG: 10 INJECTION, EMULSION INTRAVENOUS at 10:19

## 2023-05-31 RX ADMIN — EPHEDRINE SULFATE 10 MG: 50 INJECTION INTRAVENOUS at 10:07

## 2023-05-31 RX ADMIN — PROPOFOL 20 MG: 10 INJECTION, EMULSION INTRAVENOUS at 10:06

## 2023-05-31 RX ADMIN — PROPOFOL 30 MG: 10 INJECTION, EMULSION INTRAVENOUS at 10:15

## 2023-05-31 RX ADMIN — PROPOFOL 20 MG: 10 INJECTION, EMULSION INTRAVENOUS at 10:10

## 2023-05-31 RX ADMIN — PROPOFOL 30 MG: 10 INJECTION, EMULSION INTRAVENOUS at 10:12

## 2023-05-31 RX ADMIN — PROPOFOL 50 MG: 10 INJECTION, EMULSION INTRAVENOUS at 10:03

## 2023-05-31 RX ADMIN — PROPOFOL 30 MG: 10 INJECTION, EMULSION INTRAVENOUS at 10:09

## 2023-05-31 RX ADMIN — LIDOCAINE HYDROCHLORIDE 60 MG: 20 INJECTION, SOLUTION EPIDURAL; INFILTRATION; INTRACAUDAL; PERINEURAL at 10:03

## 2023-05-31 RX ADMIN — SODIUM CHLORIDE, POTASSIUM CHLORIDE, SODIUM LACTATE AND CALCIUM CHLORIDE: 600; 310; 30; 20 INJECTION, SOLUTION INTRAVENOUS at 09:58

## 2023-05-31 ASSESSMENT — PAIN SCALES - GENERAL
PAINLEVEL_OUTOF10: 0
PAINLEVEL_OUTOF10: 0

## 2023-05-31 ASSESSMENT — PAIN - FUNCTIONAL ASSESSMENT: PAIN_FUNCTIONAL_ASSESSMENT: NONE - DENIES PAIN

## 2023-05-31 NOTE — ANESTHESIA POSTPROCEDURE EVALUATION
Department of Anesthesiology  Postprocedure Note    Patient: Osmany Ross  MRN: [id number]  YOB: 1955  Date of evaluation: 5/31/2023      Procedure Summary     Date: 05/31/23 Room / Location: [facility] 04 / [facility] ENDOSCOPY    Anesthesia Start: 0958 Anesthesia Stop: 26    Procedure: COLONOSCOPY DIAGNOSTIC (Lower GI Region) Diagnosis:       Screen for colon cancer      (Screen for colon cancer [Z12.11])    Surgeons: Aline Lopez MD Responsible Provider: Marely Nielson MD    Anesthesia Type: MAC, TIVA ASA Status: 3          Anesthesia Type: MAC, TIVA    Simon Phase I:      Simon Phase II:        Anesthesia Post Evaluation    Patient location during evaluation: bedside (Endoscopy Unit)  Patient participation: complete - patient participated  Level of consciousness: sleepy but conscious  Pain score: 0  Airway patency: patent  Nausea & Vomiting: no nausea and no vomiting  Complications: no  Cardiovascular status: hemodynamically stable  Respiratory status: acceptable  Hydration status: stable  Comments: This patient remained on the stretcher. The patient was handed off to the endoscopy nursing team.  All questions regarding pre-, intra-, and postoperative care were answered.   Multimodal analgesia pain management approach

## 2023-05-31 NOTE — ANESTHESIA PRE PROCEDURE
Weight: 87.1 kg (192 lb)                                              BP Readings from Last 3 Encounters:   No data found for BP       NPO Status:                                                                                 BMI:   Wt Readings from Last 3 Encounters:   No data found for Wt     Body mass index is 27.55 kg/m². CBC:   Lab Results   Component Value Date/Time    WBC 9.5 04/03/2023 12:06 PM    RBC 5.13 04/03/2023 12:06 PM    HGB 15.1 04/03/2023 12:06 PM    HCT 45.0 04/03/2023 12:06 PM    MCV 87.7 04/03/2023 12:06 PM    RDW 13.2 04/03/2023 12:06 PM     04/03/2023 12:06 PM       CMP:   Lab Results   Component Value Date/Time     04/03/2023 12:06 PM    K 3.7 04/03/2023 12:06 PM     04/03/2023 12:06 PM    CO2 29 04/03/2023 12:06 PM    BUN 24 04/03/2023 12:06 PM    CREATININE 1.24 04/03/2023 12:06 PM    GFRAA >60 07/09/2022 09:35 AM    AGRATIO 1.0 04/03/2023 12:06 PM    GLUCOSE 138 04/03/2023 12:06 PM    PROT 7.7 04/03/2023 12:06 PM    CALCIUM 8.8 04/03/2023 12:06 PM    BILITOT 0.9 04/03/2023 12:06 PM    ALKPHOS 67 04/03/2023 12:06 PM    AST 17 04/03/2023 12:06 PM    ALT 27 04/03/2023 12:06 PM       POC Tests: No results for input(s): POCGLU, POCNA, POCK, POCCL, POCBUN, POCHEMO, POCHCT in the last 72 hours.     Coags:   Lab Results   Component Value Date/Time    PROTIME 14.0 11/22/2021 12:45 PM    INR 1.1 11/22/2021 12:45 PM    APTT 34.6 11/22/2021 12:45 PM       HCG (If Applicable): No results found for: PREGTESTUR, PREGSERUM, HCG, HCGQUANT     ABGs: No results found for: PHART, PO2ART, YJF4IUY, ZYN3ETQ, BEART, [id number]     Type & Screen (If Applicable):  No results found for: LABABO, LABRH    Drug/Infectious Status (If Applicable):  No results found for: HIV, HEPCAB    COVID-19 Screening (If Applicable): No results found for: COVID19        Anesthesia Evaluation  Patient summary reviewed and Nursing notes reviewed  Airway: Mallampati: II  TM distance: >3 FB   Neck ROM: full  Mouth

## 2023-07-11 ENCOUNTER — HOSPITAL ENCOUNTER (OUTPATIENT)
Facility: HOSPITAL | Age: 68
Discharge: HOME | End: 2023-07-14

## 2023-07-11 DIAGNOSIS — E11.22 TYPE 2 DIABETES MELLITUS WITH DIABETIC CHRONIC KIDNEY DISEASE, UNSPECIFIED CKD STAGE, UNSPECIFIED WHETHER LONG TERM INSULIN USE (HCC): ICD-10-CM

## 2023-07-20 ENCOUNTER — CLINICAL DOCUMENTATION (OUTPATIENT)
Facility: HOSPITAL | Age: 68
End: 2023-07-20

## 2023-10-04 ENCOUNTER — TRANSCRIBE ORDERS (OUTPATIENT)
Facility: HOSPITAL | Age: 68
End: 2023-10-04

## 2023-10-04 DIAGNOSIS — R31.0 GROSS HEMATURIA: Primary | ICD-10-CM

## 2023-10-20 ENCOUNTER — HOSPITAL ENCOUNTER (OUTPATIENT)
Facility: HOSPITAL | Age: 68
End: 2023-10-20
Payer: MEDICARE

## 2023-10-20 DIAGNOSIS — R31.0 GROSS HEMATURIA: ICD-10-CM

## 2023-10-20 LAB — CREAT BLD-MCNC: 1.3 MG/DL (ref 0.6–1.3)

## 2023-10-20 PROCEDURE — 6360000004 HC RX CONTRAST MEDICATION: Performed by: UROLOGY

## 2023-10-20 PROCEDURE — 74178 CT ABD&PLV WO CNTR FLWD CNTR: CPT

## 2023-10-20 PROCEDURE — 82565 ASSAY OF CREATININE: CPT

## 2024-04-26 ENCOUNTER — TRANSCRIBE ORDERS (OUTPATIENT)
Dept: SCHEDULING | Age: 69
End: 2024-04-26

## 2024-05-01 NOTE — PROGRESS NOTES
Mr. Malu Seaman is here today for anticoagulation monitoring for the diagnosis of atrial fibrillation.  His INR goal is 2.0-3.0 and his current Coumadin dose is Coumadin 5mg daily except 2.5mg on Mondays. Recheck in 2 weeks.     Today's findings include an INR of 2.9     Considering Mr. Seaman's past history, todays findings, and per the coumadin policy/protocol, Mr. Seaman was instructed to take Coumadin as follows,  same.  He was also instructed to come back in 2 weeks for an INR check.    A full discussion of the nature of anticoagulants has been carried out.  A full discussion of the need for frequent and regular monitoring, precise dosage adjustment and compliance was stressed.  Side effects of potential bleeding were discussed and Mr. Seaman was instructed to call [phone] if there are any signs of abnormal bleeding.  Mr. Seaman was instructed to avoid any OTC items containing aspirin or ibuprofen and prior to starting any new OTC products to consult with his physician or pharmacist to ensure no drug interactions are present.  Mr. Seaman was instructed to avoid any major changes in his general diet and to avoid alcohol consumption.  .      Mr. Seaman verbalized his understanding of all instructions and will call the office with any questions, concerns, or signs of abnormal bleeding or blood clot.         Problem: Falls - Risk of  Goal: *Absence of Falls  Description: Document Burke Beal Fall Risk and appropriate interventions in the flowsheet.   Outcome: Progressing Towards Goal  Note: Fall Risk Interventions:     Bed in lowest position     Call light within reach    Bed brakes applied

## 2024-05-22 ENCOUNTER — HOSPITAL ENCOUNTER (EMERGENCY)
Facility: HOSPITAL | Age: 69
Discharge: HOME | End: 2024-05-22
Attending: EMERGENCY MEDICINE
Payer: MEDICARE

## 2024-05-22 ENCOUNTER — APPOINTMENT (OUTPATIENT)
Facility: HOSPITAL | Age: 69
End: 2024-05-22
Payer: MEDICARE

## 2024-05-22 VITALS
OXYGEN SATURATION: 99 % | RESPIRATION RATE: 18 BRPM | HEIGHT: 70 IN | SYSTOLIC BLOOD PRESSURE: 146 MMHG | TEMPERATURE: 98 F | DIASTOLIC BLOOD PRESSURE: 72 MMHG | WEIGHT: 190 LBS | BODY MASS INDEX: 27.2 KG/M2 | HEART RATE: 57 BPM

## 2024-05-22 DIAGNOSIS — S92.912B OPEN DISPLACED FRACTURE OF PHALANX OF TOE OF LEFT FOOT, UNSPECIFIED TOE, INITIAL ENCOUNTER: Primary | ICD-10-CM

## 2024-05-22 PROCEDURE — 90471 IMMUNIZATION ADMIN: CPT | Performed by: EMERGENCY MEDICINE

## 2024-05-22 PROCEDURE — 6360000002 HC RX W HCPCS: Performed by: EMERGENCY MEDICINE

## 2024-05-22 PROCEDURE — 90714 TD VACC NO PRESV 7 YRS+ IM: CPT | Performed by: EMERGENCY MEDICINE

## 2024-05-22 PROCEDURE — 73630 X-RAY EXAM OF FOOT: CPT

## 2024-05-22 PROCEDURE — 6370000000 HC RX 637 (ALT 250 FOR IP): Performed by: EMERGENCY MEDICINE

## 2024-05-22 PROCEDURE — 99284 EMERGENCY DEPT VISIT MOD MDM: CPT

## 2024-05-22 RX ORDER — TETANUS AND DIPHTHERIA TOXOIDS ADSORBED 2; 2 [LF]/.5ML; [LF]/.5ML
0.5 INJECTION INTRAMUSCULAR ONCE
Status: DISCONTINUED | OUTPATIENT
Start: 2024-05-22 | End: 2024-05-22

## 2024-05-22 RX ORDER — CEPHALEXIN 500 MG/1
500 CAPSULE ORAL 4 TIMES DAILY
Qty: 21 CAPSULE | Refills: 0 | Status: SHIPPED | OUTPATIENT
Start: 2024-05-22 | End: 2024-05-29

## 2024-05-22 RX ORDER — GINSENG 100 MG
CAPSULE ORAL
Status: COMPLETED | OUTPATIENT
Start: 2024-05-22 | End: 2024-05-22

## 2024-05-22 RX ORDER — TETANUS AND DIPHTHERIA TOXOIDS ADSORBED 2; 2 [LF]/.5ML; [LF]/.5ML
0.5 INJECTION INTRAMUSCULAR ONCE
Status: DISCONTINUED | OUTPATIENT
Start: 2024-05-22 | End: 2024-05-22 | Stop reason: CLARIF

## 2024-05-22 RX ADMIN — CLOSTRIDIUM TETANI TOXOID ANTIGEN (FORMALDEHYDE INACTIVATED) AND CORYNEBACTERIUM DIPHTHERIAE TOXOID ANTIGEN (FORMALDEHYDE INACTIVATED) 0.5 ML: 5; 2 INJECTION, SUSPENSION INTRAMUSCULAR at 19:31

## 2024-05-22 RX ADMIN — BACITRACIN: 500 OINTMENT TOPICAL at 19:56

## 2024-05-22 ASSESSMENT — LIFESTYLE VARIABLES
HOW MANY STANDARD DRINKS CONTAINING ALCOHOL DO YOU HAVE ON A TYPICAL DAY: 1 OR 2
HOW OFTEN DO YOU HAVE A DRINK CONTAINING ALCOHOL: MONTHLY OR LESS

## 2024-05-22 ASSESSMENT — PAIN SCALES - GENERAL: PAINLEVEL_OUTOF10: 2

## 2024-05-22 ASSESSMENT — PAIN DESCRIPTION - FREQUENCY: FREQUENCY: CONTINUOUS

## 2024-05-22 ASSESSMENT — PAIN DESCRIPTION - PAIN TYPE: TYPE: ACUTE PAIN

## 2024-05-22 ASSESSMENT — PAIN - FUNCTIONAL ASSESSMENT
PAIN_FUNCTIONAL_ASSESSMENT: ACTIVITIES ARE NOT PREVENTED
PAIN_FUNCTIONAL_ASSESSMENT: 0-10

## 2024-05-22 ASSESSMENT — PAIN DESCRIPTION - ONSET: ONSET: SUDDEN

## 2024-05-22 ASSESSMENT — PAIN DESCRIPTION - ORIENTATION: ORIENTATION: LEFT

## 2024-05-22 ASSESSMENT — PAIN DESCRIPTION - DESCRIPTORS: DESCRIPTORS: ACHING;SORE

## 2024-05-22 ASSESSMENT — PAIN DESCRIPTION - LOCATION: LOCATION: TOE (COMMENT WHICH ONE)

## 2024-05-22 NOTE — ED TRIAGE NOTES
Pt reports dropping a piece of metal on left 3rd toe approx 4-5 hrs ago. Pt unable to get toe to stop bleeding to stop. Bandage in place, toe nail coming off, +bleeding. Unknown last Tetanus.

## 2024-05-23 NOTE — ED PROVIDER NOTES
signed)    (Please note that parts of this dictation were completed with voice recognition software. Quite often unanticipated grammatical, syntax, homophones, and other interpretive errors are inadvertently transcribed by the computer software. Please disregards these errors. Please excuse any errors that have escaped final proofreading.)     Arnold Mullen MD  05/23/24 0252

## 2024-06-25 ENCOUNTER — HOSPITAL ENCOUNTER (OUTPATIENT)
Facility: HOSPITAL | Age: 69
Discharge: HOME | End: 2024-06-28
Payer: MEDICARE

## 2024-06-25 VITALS
OXYGEN SATURATION: 96 % | SYSTOLIC BLOOD PRESSURE: 116 MMHG | DIASTOLIC BLOOD PRESSURE: 52 MMHG | HEART RATE: 37 BPM | RESPIRATION RATE: 12 BRPM

## 2024-06-25 DIAGNOSIS — I25.5 ISCHEMIC CARDIOMYOPATHY: ICD-10-CM

## 2024-06-25 LAB — CREAT BLD-MCNC: 1.5 MG/DL (ref 0.6–1.3)

## 2024-06-25 PROCEDURE — 6370000000 HC RX 637 (ALT 250 FOR IP)

## 2024-06-25 PROCEDURE — 82565 ASSAY OF CREATININE: CPT

## 2024-06-25 PROCEDURE — 75574 CT ANGIO HRT W/3D IMAGE: CPT

## 2024-06-25 PROCEDURE — 6360000004 HC RX CONTRAST MEDICATION: Performed by: INTERNAL MEDICINE

## 2024-06-25 RX ORDER — TAMSULOSIN HYDROCHLORIDE 0.4 MG/1
0.4 CAPSULE ORAL DAILY
COMMUNITY

## 2024-06-25 RX ORDER — NITROGLYCERIN 0.4 MG/1
TABLET SUBLINGUAL
Status: COMPLETED
Start: 2024-06-25 | End: 2024-06-25

## 2024-06-25 RX ORDER — NITROGLYCERIN 0.4 MG/1
0.8 TABLET SUBLINGUAL ONCE
Status: COMPLETED | OUTPATIENT
Start: 2024-06-25 | End: 2024-06-25

## 2024-06-25 RX ADMIN — NITROGLYCERIN 0.8 MG: 0.4 TABLET, ORALLY DISINTEGRATING SUBLINGUAL at 08:44

## 2024-06-25 RX ADMIN — IOPAMIDOL 80 ML: 755 INJECTION, SOLUTION INTRAVENOUS at 08:54

## 2024-06-25 RX ADMIN — NITROGLYCERIN 0.8 MG: 0.4 TABLET SUBLINGUAL at 08:44

## 2024-06-25 NOTE — PROGRESS NOTES
Patient brought back from the waiting room for preparation of CT Coronary scan.    Patient was prescribed oral beta blocker protocol to be taken at home prior to the exam. Reports feeling \"tired\" after taking the doses.    20 gauge diffusics IV initiated in the right AC.  Brisk blood return present,  POC Creat lab studies were requested to be drawn. Brisk NSS flush with ease.    Current vitals are as follows:     57  HR 36    IV metoprolol 5 mg was not given to lower heart rate time N/A doses, see MAR.    Called CT at 08:44 to verify ready for patient.     Nitroglycerin 0.4 mg SL x 2 given to patient per protocol at 08:44 AM. Denies history of sensitivity to NTG. See MAR    Taken to CT via stretcher or W/C at 08:45 AM. Patient  assisted to supine position of comfort on CT table and placed on cardiac monitor and NIBP measurement.  Cardiac monitor is showing sinus bradycardia with PVC's, PAC's occasional to frequent with HR 35-40 bpm.     See flowsheets for subsequent VS during scan.    Test completed, vitals after exam are as follows:     65 sitting on side of CT table.  HR 38    Denies dizziness upon arising. IV catheter removed intact, site is clear. Dressing applied. Patient discharged from CT room. Instructed to drink at least 8 glasses of water to flush contrast from system today.     Archana Moore RN

## 2024-09-26 ENCOUNTER — HOSPITAL ENCOUNTER (EMERGENCY)
Facility: HOSPITAL | Age: 69
Discharge: HOME | End: 2024-09-26
Attending: EMERGENCY MEDICINE
Payer: MEDICARE

## 2024-09-26 VITALS
OXYGEN SATURATION: 97 % | HEIGHT: 70 IN | HEART RATE: 53 BPM | TEMPERATURE: 97.8 F | RESPIRATION RATE: 16 BRPM | SYSTOLIC BLOOD PRESSURE: 172 MMHG | WEIGHT: 190 LBS | DIASTOLIC BLOOD PRESSURE: 67 MMHG | BODY MASS INDEX: 27.2 KG/M2

## 2024-09-26 DIAGNOSIS — I15.9 SECONDARY HYPERTENSION: Primary | ICD-10-CM

## 2024-09-26 LAB
ALBUMIN SERPL-MCNC: 3.8 G/DL (ref 3.5–5)
ALBUMIN/GLOB SERPL: 1.1 (ref 1.1–2.2)
ALP SERPL-CCNC: 83 U/L (ref 45–117)
ALT SERPL-CCNC: 31 U/L (ref 12–78)
ANION GAP SERPL CALC-SCNC: 8 MMOL/L (ref 2–12)
AST SERPL W P-5'-P-CCNC: 22 U/L (ref 15–37)
BILIRUB SERPL-MCNC: 0.8 MG/DL (ref 0.2–1)
BNP SERPL-MCNC: 629 PG/ML
BUN SERPL-MCNC: 25 MG/DL (ref 6–20)
BUN/CREAT SERPL: 19 (ref 12–20)
CA-I BLD-MCNC: 8.8 MG/DL (ref 8.5–10.1)
CHLORIDE SERPL-SCNC: 109 MMOL/L (ref 97–108)
CO2 SERPL-SCNC: 25 MMOL/L (ref 21–32)
CREAT SERPL-MCNC: 1.31 MG/DL (ref 0.7–1.3)
ERYTHROCYTE [DISTWIDTH] IN BLOOD BY AUTOMATED COUNT: 13.1 % (ref 11.5–14.5)
GLOBULIN SER CALC-MCNC: 3.4 G/DL (ref 2–4)
GLUCOSE SERPL-MCNC: 127 MG/DL (ref 65–100)
HCT VFR BLD AUTO: 45.5 % (ref 36.6–50.3)
HGB BLD-MCNC: 16 G/DL (ref 12.1–17)
MCH RBC QN AUTO: 30.2 PG (ref 26–34)
MCHC RBC AUTO-ENTMCNC: 35.2 G/DL (ref 30–36.5)
MCV RBC AUTO: 86 FL (ref 80–99)
NRBC # BLD: 0 K/UL (ref 0–0.01)
NRBC BLD-RTO: 0 PER 100 WBC
PLATELET # BLD AUTO: 208 K/UL (ref 150–400)
PMV BLD AUTO: 9.7 FL (ref 8.9–12.9)
POTASSIUM SERPL-SCNC: 4.2 MMOL/L (ref 3.5–5.1)
PROT SERPL-MCNC: 7.2 G/DL (ref 6.4–8.2)
RBC # BLD AUTO: 5.29 M/UL (ref 4.1–5.7)
SODIUM SERPL-SCNC: 142 MMOL/L (ref 136–145)
TROPONIN I SERPL HS-MCNC: 24 NG/L (ref 0–76)
TROPONIN I SERPL HS-MCNC: 25 NG/L (ref 0–76)
WBC # BLD AUTO: 8.9 K/UL (ref 4.1–11.1)

## 2024-09-26 PROCEDURE — 99283 EMERGENCY DEPT VISIT LOW MDM: CPT

## 2024-09-26 PROCEDURE — 85027 COMPLETE CBC AUTOMATED: CPT

## 2024-09-26 PROCEDURE — 36415 COLL VENOUS BLD VENIPUNCTURE: CPT

## 2024-09-26 PROCEDURE — 83880 ASSAY OF NATRIURETIC PEPTIDE: CPT

## 2024-09-26 PROCEDURE — 80053 COMPREHEN METABOLIC PANEL: CPT

## 2024-09-26 PROCEDURE — 84484 ASSAY OF TROPONIN QUANT: CPT

## 2024-09-26 RX ORDER — HYDRALAZINE HYDROCHLORIDE 50 MG/1
25 TABLET, FILM COATED ORAL 4 TIMES DAILY
Qty: 90 TABLET | Refills: 0 | Status: SHIPPED | OUTPATIENT
Start: 2024-09-26

## 2024-09-26 ASSESSMENT — PAIN SCALES - GENERAL: PAINLEVEL_OUTOF10: 5

## 2024-09-26 ASSESSMENT — LIFESTYLE VARIABLES
HOW MANY STANDARD DRINKS CONTAINING ALCOHOL DO YOU HAVE ON A TYPICAL DAY: PATIENT DOES NOT DRINK
HOW OFTEN DO YOU HAVE A DRINK CONTAINING ALCOHOL: NEVER

## 2024-09-26 ASSESSMENT — PAIN - FUNCTIONAL ASSESSMENT: PAIN_FUNCTIONAL_ASSESSMENT: 0-10

## (undated) DEVICE — CATHETER ANGIO 5FR L100CM GRY S STL NYL JR4 3 SEG BRAID L

## (undated) DEVICE — SYRINGE ANGIO 10 CC POLYCARB DK GRN MEDALLION DISP

## (undated) DEVICE — Device: Brand: OMNIWIRE PRESSURE GUIDE WIRE

## (undated) DEVICE — CATHETER ANGIO PIG 145 0.045 INX5 FRX110 CM THRULUMEN EXPO

## (undated) DEVICE — TOOL INSRT ANGI GDWIRE MTL SS --

## (undated) DEVICE — TUBING PRESS INJ FLX SH 30IN --

## (undated) DEVICE — BAND COMPR L24CM REG CLR PLAS HEMSTAT EXT HK AND LOOP RETEN

## (undated) DEVICE — GUIDEWIRE VASC J 3 CM 0.014 INX190 CM HI TORQ WHISPER MS

## (undated) DEVICE — GUIDEWIRE VASC L260CM DIA0.035IN RAD 3MM J TIP L7CM PTFE

## (undated) DEVICE — HI-TORQUE BALANCE MIDDLEWEIGHT UNIVERSAL GUIDE WIRE .014 STRAIGHT TIP 3.0 CM X 300 CM: Brand: HI-TORQUE BALANCE MIDDLEWEIGHT UNIVERSAL

## (undated) DEVICE — CATH BLLN DIL 2.5 X12MM RX -- EUPHORA

## (undated) DEVICE — CATHETER GUID EXTRA BACKUP 3.5 0.070IN 6FR 100CM VISTA BRITE TIP

## (undated) DEVICE — GLIDESHEATH SLENDER STAINLESS STEEL KIT: Brand: GLIDESHEATH SLENDER

## (undated) DEVICE — ENDOSCOPIC KIT 1.1+ DE BOWL

## (undated) DEVICE — PRESSURE TUBING: Brand: TRUWAVE

## (undated) DEVICE — SYRINGE MED 10ML PUR GAM COMPATIBLE POLYCARB FIX M LUER CONN

## (undated) DEVICE — SYRINGE MED 10ML RED POLYCARB BRL FIX M LUER CONN FLAT GRP

## (undated) DEVICE — WASTEBAG DRIP/ADAPTER: Brand: MEDLINE INDUSTRIES, INC.

## (undated) DEVICE — CANNULA NASAL ADULT 10FT ETCO2/CO2 VENTFLO

## (undated) DEVICE — RADIFOCUS OPTITORQUE ANGIOGRAPHIC CATHETER: Brand: OPTITORQUE

## (undated) DEVICE — FINECROSS MG CORONARY MICRO-GUIDE CATHETER: Brand: FINECROSS

## (undated) DEVICE — BOWL UTIL 16OZ STRL --

## (undated) DEVICE — GUIDEWIRE WITH ICE™ HYDROPHILIC COATING: Brand: CHOICE™ PT

## (undated) DEVICE — SYRINGE ANGIO 20ML WHT POLYCARB VAC PRSS CAP PLUNG FIX M

## (undated) DEVICE — MASK ANES INF SZ 2 PREM TAIL VLV INFL PRT UNSCENTED SGL PT

## (undated) DEVICE — 3M™ STERI-DRAPE™ SMALL DRAPE WITH ADHESIVE APERTURE 1092 25/BX,4/CS&#X20;: Brand: STERI-DRAPE™

## (undated) DEVICE — SURGICAL PROCEDURE TRAY CRD CATH 3 PRT

## (undated) DEVICE — SURSITE 4 X 4.8  MED MSC2705Z

## (undated) DEVICE — Z DUPLICATE USE 2103554 VALVE HEMOSTATIC BLEEDBK CTRL COPILOT

## (undated) DEVICE — RUNTHROUGH NS EXTRA FLOPPY PTCA GUIDEWIRE: Brand: RUNTHROUGH

## (undated) DEVICE — DEVICE INFL SYR BLLN ENDO 30 -- INTELLI

## (undated) DEVICE — CATHETER GUID 6FR L100CM GRN PTFE JR4 TRUELUMEN HYBRID

## (undated) DEVICE — DEVICE TORQ FLRESCNT PNK FOR HEMSTAS VLV

## (undated) DEVICE — COPILOT BLEEDBACK CONTROL VALVE: Brand: COPILOT

## (undated) DEVICE — SC 3W MP RA OFF PB - PG: Brand: NAMIC